# Patient Record
Sex: FEMALE | Race: WHITE | NOT HISPANIC OR LATINO | Employment: OTHER | URBAN - METROPOLITAN AREA
[De-identification: names, ages, dates, MRNs, and addresses within clinical notes are randomized per-mention and may not be internally consistent; named-entity substitution may affect disease eponyms.]

---

## 2020-05-28 ENCOUNTER — HISTORICAL (OUTPATIENT)
Dept: ADMINISTRATIVE | Facility: HOSPITAL | Age: 70
End: 2020-05-28

## 2021-03-18 ENCOUNTER — HISTORICAL (OUTPATIENT)
Dept: ADMINISTRATIVE | Facility: HOSPITAL | Age: 71
End: 2021-03-18

## 2021-09-15 ENCOUNTER — HISTORICAL (OUTPATIENT)
Dept: ADMINISTRATIVE | Facility: HOSPITAL | Age: 71
End: 2021-09-15

## 2021-09-15 LAB
BUN SERPL-MCNC: 24.7 MG/DL (ref 9.8–20.1)
CALCIUM SERPL-MCNC: 10.8 MG/DL (ref 8.4–10.2)
CHLORIDE SERPL-SCNC: 105 MMOL/L (ref 98–107)
CO2 SERPL-SCNC: 29 MMOL/L (ref 23–31)
CREAT SERPL-MCNC: 1.11 MG/DL (ref 0.55–1.02)
CREAT/UREA NIT SERPL: 22
DEPRECATED CALCIDIOL+CALCIFEROL SERPL-MC: 73.7 NG/ML (ref 30–80)
GLUCOSE SERPL-MCNC: 115 MG/DL (ref 82–115)
POTASSIUM SERPL-SCNC: 3.8 MMOL/L (ref 3.5–5.1)
SODIUM SERPL-SCNC: 144 MMOL/L (ref 136–145)
T3FREE SERPL-MCNC: 1.41 PG/ML (ref 1.58–3.91)
T4 FREE SERPL-MCNC: 0.74 NG/DL (ref 0.7–1.48)
TSH SERPL-ACNC: 0.32 UIU/ML (ref 0.35–4.94)

## 2022-03-14 ENCOUNTER — HISTORICAL (OUTPATIENT)
Dept: ADMINISTRATIVE | Facility: HOSPITAL | Age: 72
End: 2022-03-14

## 2022-03-14 LAB
ABS NEUT (OLG): 3.65 (ref 2.1–9.2)
ALBUMIN SERPL-MCNC: 3.7 G/DL (ref 3.4–4.8)
ALBUMIN/GLOB SERPL: 1.1 {RATIO} (ref 1.1–2)
ALP SERPL-CCNC: 59 U/L (ref 40–150)
ALT SERPL-CCNC: 23 U/L (ref 0–55)
APPEARANCE, UA: CLEAR
AST SERPL-CCNC: 20 U/L (ref 5–34)
BACTERIA SPEC CULT: NORMAL
BASOPHILS # BLD AUTO: 0 10*3/UL (ref 0–0.2)
BASOPHILS NFR BLD AUTO: 0 %
BILIRUB SERPL-MCNC: 0.4 MG/DL
BILIRUB UR QL STRIP: NEGATIVE
BILIRUBIN DIRECT+TOT PNL SERPL-MCNC: 0.2 (ref 0–0.5)
BILIRUBIN DIRECT+TOT PNL SERPL-MCNC: 0.2 (ref 0–0.8)
BUDDING YEAST: NORMAL
BUN SERPL-MCNC: 14.9 MG/DL (ref 9.8–20.1)
CALCIUM SERPL-MCNC: 10 MG/DL (ref 8.7–10.5)
CASTS, UA: 9
CHLORIDE SERPL-SCNC: 102 MMOL/L (ref 98–107)
CHOLEST SERPL-MCNC: 183 MG/DL
CHOLEST/HDLC SERPL: 5 {RATIO} (ref 0–5)
CO2 SERPL-SCNC: 30 MMOL/L (ref 23–31)
COLOR UR: NORMAL
CREAT SERPL-MCNC: 0.76 MG/DL (ref 0.55–1.02)
CRYSTALS: NORMAL
EOSINOPHIL # BLD AUTO: 0.1 10*3/UL (ref 0–0.9)
EOSINOPHIL NFR BLD AUTO: 2 %
ERYTHROCYTE [DISTWIDTH] IN BLOOD BY AUTOMATED COUNT: 12.9 % (ref 11.5–17)
EST. AVERAGE GLUCOSE BLD GHB EST-MCNC: 119.8 MG/DL
GLOBULIN SER-MCNC: 3.5 G/DL (ref 2.4–3.5)
GLUCOSE (UA): NEGATIVE
GLUCOSE SERPL-MCNC: 105 MG/DL (ref 82–115)
HBA1C MFR BLD: 5.8 %
HCT VFR BLD AUTO: 40.5 % (ref 37–47)
HDLC SERPL-MCNC: 40 MG/DL (ref 35–60)
HEMOLYSIS INTERF INDEX SERPL-ACNC: 0
HGB BLD-MCNC: 13.2 G/DL (ref 12–16)
HGB UR QL STRIP: NEGATIVE
ICTERIC INTERF INDEX SERPL-ACNC: 0
KETONES UR QL STRIP: NEGATIVE
LDLC SERPL CALC-MCNC: 108 MG/DL (ref 50–140)
LEUKOCYTE ESTERASE UR QL STRIP: NORMAL
LIPEMIC INTERF INDEX SERPL-ACNC: 0
LYMPHOCYTES # BLD AUTO: 2.4 10*3/UL (ref 0.6–4.6)
LYMPHOCYTES NFR BLD AUTO: 36 %
MANUAL DIFF? (OHS): NO
MCH RBC QN AUTO: 29.9 PG (ref 27–31)
MCHC RBC AUTO-ENTMCNC: 32.6 G/DL (ref 33–36)
MCV RBC AUTO: 91.8 FL (ref 80–94)
MONOCYTES # BLD AUTO: 0.6 10*3/UL (ref 0.1–1.3)
MONOCYTES NFR BLD AUTO: 9 %
NEUTROPHILS # BLD AUTO: 3.65 10*3/UL (ref 2.1–9.2)
NEUTROPHILS NFR BLD AUTO: 53 %
NITRITE UR QL STRIP: NEGATIVE
PH UR STRIP: 5.5 [PH] (ref 5–9)
PLATELET # BLD AUTO: 300 10*3/UL (ref 130–400)
PMV BLD AUTO: 10 FL (ref 9.4–12.4)
POTASSIUM SERPL-SCNC: 3.7 MMOL/L (ref 3.5–5.1)
PROT SERPL-MCNC: 7.2 G/DL (ref 5.8–7.6)
PROT UR QL STRIP: NEGATIVE
RBC # BLD AUTO: 4.41 10*6/UL (ref 4.2–5.4)
RBC #/AREA URNS HPF: NORMAL /[HPF] (ref 0–2)
SMALL ROUND CELLS, UA: NORMAL
SODIUM SERPL-SCNC: 142 MMOL/L (ref 136–145)
SP GR UR STRIP: 1.02 (ref 1–1.03)
SPERM URNS QL MICRO: NORMAL
SQUAMOUS EPITHELIAL, UA: 7 (ref 0–4)
T3FREE SERPL-MCNC: 6.85 PG/ML (ref 1.58–3.91)
T4 FREE SERPL-MCNC: 0.77 NG/DL (ref 0.7–1.48)
TRIGL SERPL-MCNC: 176 MG/DL (ref 37–140)
TSH SERPL-ACNC: 0.07 M[IU]/L (ref 0.35–4.94)
UROBILINOGEN UR STRIP-ACNC: 0.2
VLDLC SERPL CALC-MCNC: 35 MG/DL
WBC # SPEC AUTO: 6.8 10*3/UL (ref 4.5–11.5)
WBC #/AREA URNS HPF: NORMAL /[HPF] (ref 0–2)

## 2022-03-15 LAB — DEPRECATED CALCIDIOL+CALCIFEROL SERPL-MC: 75 NG/ML (ref 30–80)

## 2022-04-10 ENCOUNTER — HISTORICAL (OUTPATIENT)
Dept: ADMINISTRATIVE | Facility: HOSPITAL | Age: 72
End: 2022-04-10

## 2022-04-28 DIAGNOSIS — M51.36 DDD (DEGENERATIVE DISC DISEASE), LUMBAR: Primary | ICD-10-CM

## 2022-04-29 VITALS
WEIGHT: 224.56 LBS | SYSTOLIC BLOOD PRESSURE: 138 MMHG | DIASTOLIC BLOOD PRESSURE: 74 MMHG | OXYGEN SATURATION: 95 % | HEIGHT: 64 IN | BODY MASS INDEX: 38.34 KG/M2

## 2022-05-02 ENCOUNTER — TELEPHONE (OUTPATIENT)
Dept: ADMINISTRATIVE | Facility: HOSPITAL | Age: 72
End: 2022-05-02

## 2022-05-02 NOTE — TELEPHONE ENCOUNTER
Type:  Needs Medical Advice    Who Called: pt  Symptoms (please be specific): na   How long has patient had these symptoms:  na  Pharmacy name and phone #:  na  Would the patient rather a call back or a response via [facility]? Call back   Best Call Back Number: [phone]  Additional Information: pt requesting to have information for mri faxed to Dr. Hunt fax number [phone]  Please advise

## 2022-06-02 ENCOUNTER — LAB VISIT (OUTPATIENT)
Dept: LAB | Facility: HOSPITAL | Age: 72
End: 2022-06-02
Attending: FAMILY MEDICINE
Payer: MEDICARE

## 2022-06-02 DIAGNOSIS — R73.02 IGT (IMPAIRED GLUCOSE TOLERANCE): ICD-10-CM

## 2022-06-02 DIAGNOSIS — E78.5 HYPERLIPIDEMIA, UNSPECIFIED HYPERLIPIDEMIA TYPE: ICD-10-CM

## 2022-06-02 DIAGNOSIS — E03.9 HYPOTHYROIDISM, UNSPECIFIED TYPE: Primary | ICD-10-CM

## 2022-06-02 LAB
CHOLEST SERPL-MCNC: 200 MG/DL
CHOLEST/HDLC SERPL: 4 {RATIO} (ref 0–5)
EST. AVERAGE GLUCOSE BLD GHB EST-MCNC: 122.6 MG/DL
HBA1C MFR BLD: 5.9 %
HDLC SERPL-MCNC: 49 MG/DL (ref 35–60)
LDLC SERPL CALC-MCNC: 126 MG/DL (ref 50–140)
T3FREE SERPL-MCNC: 1.69 PG/ML (ref 1.57–3.91)
T4 FREE SERPL-MCNC: 0.62 NG/DL (ref 0.7–1.48)
TRIGL SERPL-MCNC: 127 MG/DL (ref 37–140)
TSH SERPL-ACNC: 11.43 UIU/ML (ref 0.35–4.94)
VLDLC SERPL CALC-MCNC: 25 MG/DL

## 2022-06-02 PROCEDURE — 83036 HEMOGLOBIN GLYCOSYLATED A1C: CPT

## 2022-06-02 PROCEDURE — 84481 FREE ASSAY (FT-3): CPT

## 2022-06-02 PROCEDURE — 80061 LIPID PANEL: CPT

## 2022-06-02 PROCEDURE — 84439 ASSAY OF FREE THYROXINE: CPT

## 2022-06-02 PROCEDURE — 36415 COLL VENOUS BLD VENIPUNCTURE: CPT

## 2022-06-02 PROCEDURE — 84443 ASSAY THYROID STIM HORMONE: CPT

## 2022-06-08 RX ORDER — KETOCONAZOLE 20 MG/G
CREAM TOPICAL
COMMUNITY
Start: 2022-02-24

## 2022-06-08 RX ORDER — TRAZODONE HYDROCHLORIDE 50 MG/1
TABLET ORAL
COMMUNITY
Start: 2022-05-19 | End: 2022-08-15

## 2022-06-08 RX ORDER — ACETAMINOPHEN, DIPHENHYDRAMINE HCL, PHENYLEPHRINE HCL 325; 25; 5 MG/1; MG/1; MG/1
1 TABLET ORAL NIGHTLY
COMMUNITY
End: 2023-05-09

## 2022-06-08 RX ORDER — SERTRALINE HYDROCHLORIDE 25 MG/1
25 TABLET, FILM COATED ORAL
COMMUNITY
Start: 2021-06-22 | End: 2023-11-21

## 2022-06-08 RX ORDER — HYDROCORTISONE 25 MG/G
CREAM TOPICAL
COMMUNITY
Start: 2022-02-24 | End: 2023-11-21

## 2022-06-08 RX ORDER — NORETHINDRONE ACETATE AND ETHINYL ESTRADIOL 1; 5 MG/1; UG/1
TABLET ORAL
COMMUNITY
End: 2023-05-09

## 2022-06-08 RX ORDER — DIPHENHYDRAMINE HCL 25 MG
25 CAPSULE ORAL
COMMUNITY

## 2022-06-08 RX ORDER — VALACYCLOVIR HYDROCHLORIDE 500 MG/1
TABLET, FILM COATED ORAL
COMMUNITY
Start: 2022-05-19 | End: 2023-11-21 | Stop reason: SDUPTHER

## 2022-06-08 RX ORDER — BIOTIN 1 MG
1000 TABLET ORAL
COMMUNITY

## 2022-06-08 RX ORDER — BENAZEPRIL HYDROCHLORIDE 40 MG/1
TABLET ORAL
COMMUNITY
Start: 2021-12-27 | End: 2022-12-27 | Stop reason: SDUPTHER

## 2022-06-08 RX ORDER — DICLOFENAC SODIUM 75 MG/1
75 TABLET, DELAYED RELEASE ORAL 2 TIMES DAILY WITH MEALS
COMMUNITY
Start: 2022-05-10 | End: 2023-05-09

## 2022-06-08 RX ORDER — LORATADINE 10 MG/1
10 TABLET ORAL
COMMUNITY
End: 2023-05-09

## 2022-06-08 RX ORDER — AMOXICILLIN 500 MG
2 CAPSULE ORAL
COMMUNITY
End: 2023-05-09

## 2022-06-08 RX ORDER — THYROID 30 MG/1
TABLET ORAL
COMMUNITY
Start: 2021-09-27 | End: 2022-08-01 | Stop reason: SDUPTHER

## 2022-06-08 RX ORDER — HYDROCHLOROTHIAZIDE 25 MG/1
TABLET ORAL
COMMUNITY
Start: 2021-09-27 | End: 2022-10-06

## 2022-06-08 RX ORDER — SUMATRIPTAN SUCCINATE 100 MG/1
100 TABLET ORAL
COMMUNITY
End: 2023-05-09

## 2022-06-08 RX ORDER — GABAPENTIN 300 MG/1
CAPSULE ORAL
COMMUNITY
Start: 2022-05-10 | End: 2023-05-09

## 2022-06-08 RX ORDER — IBUPROFEN 200 MG
2 CAPSULE ORAL DAILY
COMMUNITY

## 2022-07-11 ENCOUNTER — OFFICE VISIT (OUTPATIENT)
Dept: PRIMARY CARE CLINIC | Facility: CLINIC | Age: 72
End: 2022-07-11
Payer: MEDICARE

## 2022-07-11 VITALS
SYSTOLIC BLOOD PRESSURE: 122 MMHG | DIASTOLIC BLOOD PRESSURE: 70 MMHG | BODY MASS INDEX: 35.45 KG/M2 | OXYGEN SATURATION: 95 % | HEART RATE: 70 BPM | WEIGHT: 205.13 LBS

## 2022-07-11 DIAGNOSIS — I20.9 ANGINA PECTORIS, UNSPECIFIED: ICD-10-CM

## 2022-07-11 DIAGNOSIS — R73.02 IGT (IMPAIRED GLUCOSE TOLERANCE): ICD-10-CM

## 2022-07-11 DIAGNOSIS — E66.01 MORBID (SEVERE) OBESITY DUE TO EXCESS CALORIES: Primary | ICD-10-CM

## 2022-07-11 DIAGNOSIS — E03.9 HYPOTHYROIDISM, UNSPECIFIED TYPE: ICD-10-CM

## 2022-07-11 DIAGNOSIS — I10 HYPERTENSION, UNSPECIFIED TYPE: ICD-10-CM

## 2022-07-11 PROCEDURE — 99214 PR OFFICE/OUTPT VISIT, EST, LEVL IV, 30-39 MIN: ICD-10-PCS | Mod: ,,, | Performed by: FAMILY MEDICINE

## 2022-07-11 PROCEDURE — 99214 OFFICE O/P EST MOD 30 MIN: CPT | Mod: ,,, | Performed by: FAMILY MEDICINE

## 2022-07-11 NOTE — PROGRESS NOTES
Chief Complaint  Chief Complaint   Patient presents with    Follow-up     With labs       History of Present Illness  Patient presents clinic for routine follow-up with lab review.  Lab work was performed approximately 1 month ago with plans follow-up in clinic at that time.  TSH revealed be over 11 after the instillation of armor thyroid from 150 daily down to 135 daily suppressed at less 0.06.  Free T4 at this time noted to be suppressed at 0.62 with free T3 within normal range.  Hemoglobin A1c elevated at 5.9% with a known history of IGT and FLP performed with LDL less than 130.  The patient's blood pressure is within normal range in clinic at this time and she reports that other than generalized fatigue she denies any acute complaints or concerns today.    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 Negative for chills and fever.   HENT: Negative for congestion and sore throat.    Eyes: Negative for redness and visual disturbance.   Respiratory: Negative for cough and shortness of breath.    Cardiovascular: Negative for chest pain and palpitations.   Gastrointestinal: Negative for nausea and vomiting.   Musculoskeletal: Negative for arthralgias and myalgias.   Skin: Negative for pallor and rash.   Neurological: Negative for dizziness and headaches.   Psychiatric/Behavioral: Negative for agitation and dysphoric mood.       Physical Exam  Physical Exam  Constitutional:       Appearance: She is obese.   HENT:      Head: Normocephalic and atraumatic.   Eyes:      General: No scleral icterus.     Conjunctiva/sclera: Conjunctivae  normal.   Cardiovascular:      Rate and Rhythm: Normal rate and regular rhythm.   Pulmonary:      Effort: Pulmonary effort is normal.      Breath sounds: Normal breath sounds.   Skin:     General: Skin is warm and dry.   Neurological:      General: No focal deficit present.      Mental Status: She is alert and oriented to person, place, and time.   Psychiatric:         Mood and Affect: Mood normal.         Behavior: Behavior normal.         Problem List/Past Medical History  Past Medical History:   Diagnosis Date    HTN (hypertension)     Hypothyroidism, unspecified     Insomnia     CURTIS (obstructive sleep apnea)     Vitamin D deficiency        Procedure/Surgical History  Past Surgical History:   Procedure Laterality Date    cataract surgery      DILATION AND CURETTAGE OF UTERUS      TONSILLECTOMY         Medications  Current Outpatient Medications on File Prior to Visit   Medication Sig Dispense Refill    aspirin-acetaminophen-caffeine 250-250-65 mg (EXCEDRIN MIGRAINE) 250-250-65 mg per tablet Take 1 tablet by mouth every 6 (six) hours as needed.      benazepriL (LOTENSIN) 40 MG tablet   See Instructions, TAKE 1 TABLET BY MOUTH DAILY, # 90 tab(s), 3 Refill(s), Pharmacy: [facility] #[id number], 162, cm, Height/Length Dosing, 09/14/21 8:45:00 CDT, 101.85, kg, Weight Dosing, 09/14/21 8:45:00 CDT      biotin 1 mg tablet Take 1,000 mcg by mouth.      calcium citrate (CALCITRATE) 200 mg (950 mg) tablet Take 1 tablet by mouth once daily.      diclofenac (VOLTAREN) 75 MG EC tablet Take 75 mg by mouth 2 (two) times daily with meals.      diphenhydrAMINE (BENADRYL) 25 mg capsule Take 25 mg by mouth.      gabapentin (NEURONTIN) 300 MG capsule TAKE ONE CAPSULE BY MOUTH TWICE DAILY FOR 3 DAYS THEN TAKE 1 CAPSULE THREE TIMES DAILY      hydroCHLOROthiazide (HYDRODIURIL) 25 MG tablet   See Instructions, TAKE 1 TABLET BY MOUTH DAILY, # 90 tab(s), 3 Refill(s), Pharmacy: [facility] #[id number], 162, cm,  Height/Length Dosing, 09/14/21 8:45:00 CDT, 101.85, kg, Weight Dosing, 09/14/21 8:45:00 CDT      hydrocortisone 2.5 % cream APPLY TO RASH TWICE DAILY UNTIL CLEAR      ketoconazole (NIZORAL) 2 % cream APPLY TO FACE TWICE DAILY UNTIL CLEAR      loratadine (CLARITIN) 10 mg tablet Take 10 mg by mouth.      melatonin 10 mg Tab Take 1 tablet by mouth nightly.      norethindrone-ethinyl estradiol (FEMHRT 1/5) 1-5 mg-mcg Tab Take by mouth.      omega-3 fatty acids/fish oil (FISH OIL-OMEGA-3 FATTY ACIDS) 300-1,000 mg capsule Take 2 g by mouth.      sertraline (ZOLOFT) 25 MG tablet Take 25 mg by mouth.      sumatriptan (IMITREX) 100 MG tablet Take 100 mg by mouth every 2 (two) hours as needed.      thyroid, pork, (ARMOUR THYROID) 30 mg Tab   See Instructions, TAKE 1 TABLET BY MOUTH DAILY, # 90 tab(s), 3 Refill(s), Pharmacy: [facility] #[id number], 162, cm, Height/Length Dosing, 09/14/21 8:45:00 CDT, 101.85, kg, Weight Dosing, 09/14/21 8:45:00 CDT      traZODone (DESYREL) 50 MG tablet TAKE 1 TABLET BY MOUTH EVERY DAY AT BEDTIME AS NEEDED FOR SLEEP      valACYclovir (VALTREX) 500 MG tablet        No current facility-administered medications on file prior to visit.       Allegies  Review of patient's allergies indicates:   Allergen Reactions    Adhesive Dermatitis     SKIN PATCHES        Social History  Social History     Socioeconomic History    Marital status:    Tobacco Use    Smoking status: Never Smoker    Smokeless tobacco: Never Used   Substance and Sexual Activity    Alcohol use: Never    Drug use: Never    Sexual activity: Not Currently       Family History  History reviewed. No pertinent family history.      Immunizations    There is no immunization history on file for this patient.    Health Maintenance  Health Maintenance   Topic Date Due    Hepatitis C Screening  Never done    TETANUS VACCINE  Never done    High Dose Statin  Never done    DEXA Scan  04/13/2021    Mammogram   07/01/2022    Lipid Panel  06/02/2027        Assessment / Plan  Problem List Items Addressed This Visit        Cardiac/Vascular    Angina pectoris, unspecified    Hypertension    Current Assessment & Plan     Blood pressure goal <140/90 (<130/80 if otherwise noted), recommend DASH diet, record BP at home daily and bring log to next office visit to assure that home cuff is calibrated at minimum every 12 months, continue current medication regimen.              Endocrine    Morbid (severe) obesity due to excess calories - Primary    Hypothyroidism    Current Assessment & Plan     Switch [place] Thyroid from 130 mg daily to 130 mg every other day with 150 mg every other day and reassess TSH and free T4 and free T3 levels at 2 month interval           IGT (impaired glucose tolerance)    Overview     Continue ADA diet recheck A1c at routine follow-up                 RTC 2 months for wellness    Anthony Lazar

## 2022-07-11 NOTE — ASSESSMENT & PLAN NOTE
Switch [place] Thyroid to 150mg qday with on 120mg on Sunday and reassess TSH and free T4 and free T3 levels at 2 month interval

## 2022-07-29 ENCOUNTER — TELEPHONE (OUTPATIENT)
Dept: ADMINISTRATIVE | Facility: HOSPITAL | Age: 72
End: 2022-07-29
Payer: MEDICARE

## 2022-08-01 DIAGNOSIS — E03.9 HYPOTHYROIDISM, UNSPECIFIED TYPE: Primary | ICD-10-CM

## 2022-08-01 RX ORDER — THYROID 120 MG/1
120 TABLET ORAL
Qty: 90 TABLET | Refills: 3 | Status: SHIPPED | OUTPATIENT
Start: 2022-08-01 | End: 2022-08-01 | Stop reason: SDUPTHER

## 2022-08-01 RX ORDER — THYROID 120 MG/1
120 TABLET ORAL
Qty: 90 TABLET | Refills: 3 | Status: SHIPPED | OUTPATIENT
Start: 2022-08-01 | End: 2024-03-06

## 2022-08-31 DIAGNOSIS — D68.9 COAGULOPATHY: ICD-10-CM

## 2022-08-31 DIAGNOSIS — Z01.818 PREOPERATIVE CLEARANCE: Primary | ICD-10-CM

## 2022-09-07 ENCOUNTER — LAB VISIT (OUTPATIENT)
Dept: LAB | Facility: HOSPITAL | Age: 72
End: 2022-09-07
Attending: FAMILY MEDICINE
Payer: MEDICARE

## 2022-09-07 DIAGNOSIS — D68.9 COAGULOPATHY: ICD-10-CM

## 2022-09-07 DIAGNOSIS — Z01.818 PREOPERATIVE CLEARANCE: ICD-10-CM

## 2022-09-07 LAB
ALBUMIN SERPL-MCNC: 3.8 GM/DL (ref 3.4–4.8)
ANION GAP SERPL CALC-SCNC: 9 MEQ/L
APTT PPP: 34.5 SECONDS (ref 23.2–33.7)
BASOPHILS # BLD AUTO: 0.04 X10(3)/MCL (ref 0–0.2)
BASOPHILS NFR BLD AUTO: 0.6 %
BUN SERPL-MCNC: 15 MG/DL (ref 9.8–20.1)
CALCIUM SERPL-MCNC: 10.1 MG/DL (ref 8.4–10.2)
CHLORIDE SERPL-SCNC: 104 MMOL/L (ref 98–107)
CO2 SERPL-SCNC: 28 MMOL/L (ref 23–31)
CREAT SERPL-MCNC: 0.9 MG/DL (ref 0.55–1.02)
CREAT/UREA NIT SERPL: 17
EOSINOPHIL # BLD AUTO: 0.19 X10(3)/MCL (ref 0–0.9)
EOSINOPHIL NFR BLD AUTO: 2.8 %
ERYTHROCYTE [DISTWIDTH] IN BLOOD BY AUTOMATED COUNT: 13 % (ref 11.5–17)
GFR SERPLBLD CREATININE-BSD FMLA CKD-EPI: >60 MLS/MIN/1.73/M2
GLUCOSE SERPL-MCNC: 106 MG/DL (ref 82–115)
HCT VFR BLD AUTO: 38.1 % (ref 37–47)
HGB BLD-MCNC: 12.4 GM/DL (ref 12–16)
IMM GRANULOCYTES # BLD AUTO: 0.02 X10(3)/MCL (ref 0–0.04)
IMM GRANULOCYTES NFR BLD AUTO: 0.3 %
INR BLD: 1.04 (ref 0–1.3)
LYMPHOCYTES # BLD AUTO: 2.25 X10(3)/MCL (ref 0.6–4.6)
LYMPHOCYTES NFR BLD AUTO: 33.3 %
MCH RBC QN AUTO: 29.8 PG (ref 27–31)
MCHC RBC AUTO-ENTMCNC: 32.5 MG/DL (ref 33–36)
MCV RBC AUTO: 91.6 FL (ref 80–94)
MONOCYTES # BLD AUTO: 0.58 X10(3)/MCL (ref 0.1–1.3)
MONOCYTES NFR BLD AUTO: 8.6 %
MRSA PCR SCRN (OHS): DETECTED
NEUTROPHILS # BLD AUTO: 3.7 X10(3)/MCL (ref 2.1–9.2)
NEUTROPHILS NFR BLD AUTO: 54.4 %
NRBC BLD AUTO-RTO: 0 %
PLATELET # BLD AUTO: 293 X10(3)/MCL (ref 130–400)
PMV BLD AUTO: 9.6 FL (ref 7.4–10.4)
POTASSIUM SERPL-SCNC: 3.8 MMOL/L (ref 3.5–5.1)
PREALB SERPL-MCNC: 19.2 MG/DL (ref 14–37)
PROTHROMBIN TIME: 13.5 SECONDS (ref 12.5–14.5)
RBC # BLD AUTO: 4.16 X10(6)/MCL (ref 4.2–5.4)
SODIUM SERPL-SCNC: 141 MMOL/L (ref 136–145)
WBC # SPEC AUTO: 6.8 X10(3)/MCL (ref 4.5–11.5)

## 2022-09-07 PROCEDURE — 82040 ASSAY OF SERUM ALBUMIN: CPT

## 2022-09-07 PROCEDURE — 85025 COMPLETE CBC W/AUTO DIFF WBC: CPT

## 2022-09-07 PROCEDURE — 84134 ASSAY OF PREALBUMIN: CPT

## 2022-09-07 PROCEDURE — 36415 COLL VENOUS BLD VENIPUNCTURE: CPT

## 2022-09-07 PROCEDURE — 85730 THROMBOPLASTIN TIME PARTIAL: CPT

## 2022-09-07 PROCEDURE — 80048 BASIC METABOLIC PNL TOTAL CA: CPT

## 2022-09-07 PROCEDURE — 85610 PROTHROMBIN TIME: CPT

## 2022-09-08 NOTE — PROGRESS NOTES
Please tell the patient that the nasal swab showed they have MRSA in the nasal cavity and because of this are required for 5 days to using nasal antibiotic ointment which I will send to the pharmacy at this time.  To be used least 5 days prior to surgery.

## 2022-09-13 ENCOUNTER — OFFICE VISIT (OUTPATIENT)
Dept: PRIMARY CARE CLINIC | Facility: CLINIC | Age: 72
End: 2022-09-13
Payer: MEDICARE

## 2022-09-13 VITALS
WEIGHT: 218.19 LBS | HEART RATE: 67 BPM | OXYGEN SATURATION: 97 % | SYSTOLIC BLOOD PRESSURE: 139 MMHG | BODY MASS INDEX: 37.71 KG/M2 | DIASTOLIC BLOOD PRESSURE: 78 MMHG

## 2022-09-13 DIAGNOSIS — I10 HYPERTENSION, UNSPECIFIED TYPE: ICD-10-CM

## 2022-09-13 DIAGNOSIS — Z00.00 WELLNESS EXAMINATION: Primary | ICD-10-CM

## 2022-09-13 DIAGNOSIS — R73.02 IGT (IMPAIRED GLUCOSE TOLERANCE): ICD-10-CM

## 2022-09-13 DIAGNOSIS — E03.9 HYPOTHYROIDISM, UNSPECIFIED TYPE: ICD-10-CM

## 2022-09-13 DIAGNOSIS — Z01.818 PREOPERATIVE CLEARANCE: ICD-10-CM

## 2022-09-13 DIAGNOSIS — G47.00 INSOMNIA, UNSPECIFIED TYPE: ICD-10-CM

## 2022-09-13 DIAGNOSIS — Z22.322 MRSA NASAL COLONIZATION: ICD-10-CM

## 2022-09-13 PROCEDURE — 99499 UNLISTED E&M SERVICE: CPT | Mod: ,,, | Performed by: FAMILY MEDICINE

## 2022-09-13 PROCEDURE — 99499 NO LOS: ICD-10-PCS | Mod: ,,, | Performed by: FAMILY MEDICINE

## 2022-09-13 RX ORDER — AMITRIPTYLINE HYDROCHLORIDE 25 MG/1
25 TABLET, FILM COATED ORAL NIGHTLY PRN
Qty: 30 TABLET | Refills: 3 | Status: SHIPPED | OUTPATIENT
Start: 2022-09-13 | End: 2022-09-26 | Stop reason: SDUPTHER

## 2022-09-13 RX ORDER — THYROID, PORCINE 30 MG/1
15 TABLET ORAL DAILY
COMMUNITY
Start: 2022-08-08 | End: 2023-03-21 | Stop reason: SDUPTHER

## 2022-09-13 NOTE — ASSESSMENT & PLAN NOTE
Attempt elavil 25mg qhs and monitor for side effects and discontinue if that or balance issues are noted.

## 2022-09-13 NOTE — PROGRESS NOTES
Chief Complaint  Chief Complaint   Patient presents with    Pre-op Exam       History of Present Illness  Patient presents to clinic for routine scheduled follow-up wellness visit.  Blood work performed prior to this visit includes CBC with no overly concerning findings noted with PT and INR 10 typical range with a PTT minimally elevated at 34.5 with chemistry panel including BMP with no abnormalities .  The patient had other routine labs performed 3 months ago which showed TSH elevated at 11.4264 with mild decrease of free T4 at 0.62 with free T3 within normal range and hemoglobin A1c at 5.9%.  FLP was performed with LDL of 126.  Following these thyroid abnormalities with her last clinic visit 2 months ago medications were adjusted by switching armor thyroid from 150 daily 150 mg every day except 120 mg on Sunday.  Many of the labs performed prior to this visit were for screening prior to surgery with Left L4-L5 TLIF, TBA scheduled, with MRSA by nasal PCR which was positive and the patient was given a prescription for nasal mupirocin for 5 days which she has complied with. Other than back pain main persistent complaint is insomnia not improved with melatonin and prev rx'd trazodone.     PMH:  Hypothyroidism-I with thyroid 150 mg every other day and 130 mg every other day, previously poor reaction to Synthroid causing depression  Hypertension-benazepril 40 mg daily, hydrochlorothiazide 20 mg daily   History of pericarditis-2010, viral source controlled with time with Advil, seasonal allergies-montelukast   Vitamin-D deficiency-supplementation   CURTIS-CPAP  Depression-unmedicated, previously Zoloft 25 mg daily   Recurrent HSV-famciclovir  IGT-max A1c 5.9%    Specialist:  Cardiology- Dr. Macias  Gyn-Dr. Mattie Prater    Review of Systems  Review of Systems   Constitutional:  Positive for fatigue. Negative for chills and fever.   HENT:  Negative for congestion and sore throat.    Eyes:  Negative for redness and  visual disturbance.   Respiratory:  Negative for cough and shortness of breath.    Cardiovascular:  Negative for chest pain and palpitations.   Gastrointestinal:  Negative for nausea and vomiting.   Musculoskeletal:  Positive for back pain. Negative for arthralgias and myalgias.   Skin:  Negative for pallor and rash.   Neurological:  Negative for dizziness and headaches.   Psychiatric/Behavioral:  Positive for sleep disturbance. Negative for agitation and dysphoric mood.      Physical Exam  Physical Exam  Constitutional:       Appearance: Normal appearance.   HENT:      Head: Normocephalic and atraumatic.   Eyes:      General: No scleral icterus.     Conjunctiva/sclera: Conjunctivae normal.   Cardiovascular:      Rate and Rhythm: Normal rate and regular rhythm.   Pulmonary:      Effort: Pulmonary effort is normal.      Breath sounds: Normal breath sounds.   Skin:     General: Skin is warm and dry.   Neurological:      General: No focal deficit present.      Mental Status: She is alert and oriented to person, place, and time.   Psychiatric:         Mood and Affect: Mood normal.         Behavior: Behavior normal.       Problem List/Past Medical History  Past Medical History:   Diagnosis Date    HTN (hypertension)     Hypothyroidism, unspecified     Insomnia     CURTIS (obstructive sleep apnea)     Vitamin D deficiency        Procedure/Surgical History  Past Surgical History:   Procedure Laterality Date    cataract surgery      DILATION AND CURETTAGE OF UTERUS      TONSILLECTOMY         Medications  Current Outpatient Medications on File Prior to Visit   Medication Sig Dispense Refill    ARMOUR THYROID 120 mg Tab TAKE 1 TABLET BY MOUTH DAILY 90 tablet 3    ARMOUR THYROID 30 mg Tab Take 15 mg by mouth once daily.      benazepriL (LOTENSIN) 40 MG tablet   See Instructions, TAKE 1 TABLET BY MOUTH DAILY, # 90 tab(s), 3 Refill(s), Pharmacy: [facility] #[id number], 162, cm, Height/Length Dosing, 09/14/21 8:45:00 CDT,  101.85, kg, Weight Dosing, 09/14/21 8:45:00 CDT      biotin 1 mg tablet Take 1,000 mcg by mouth.      calcium citrate (CALCITRATE) 200 mg (950 mg) tablet Take 1 tablet by mouth once daily.      diphenhydrAMINE (BENADRYL) 25 mg capsule Take 25 mg by mouth.      hydroCHLOROthiazide (HYDRODIURIL) 25 MG tablet   See Instructions, TAKE 1 TABLET BY MOUTH DAILY, # 90 tab(s), 3 Refill(s), Pharmacy: [facility] #[id number], 162, cm, Height/Length Dosing, 09/14/21 8:45:00 CDT, 101.85, kg, Weight Dosing, 09/14/21 8:45:00 CDT      melatonin 10 mg Tab Take 1 tablet by mouth nightly.      valACYclovir (VALTREX) 500 MG tablet       aspirin-acetaminophen-caffeine 250-250-65 mg (EXCEDRIN MIGRAINE) 250-250-65 mg per tablet Take 1 tablet by mouth every 6 (six) hours as needed.      diclofenac (VOLTAREN) 75 MG EC tablet Take 75 mg by mouth 2 (two) times daily with meals.      gabapentin (NEURONTIN) 300 MG capsule TAKE ONE CAPSULE BY MOUTH TWICE DAILY FOR 3 DAYS THEN TAKE 1 CAPSULE THREE TIMES DAILY      hydrocortisone 2.5 % cream APPLY TO RASH TWICE DAILY UNTIL CLEAR      ketoconazole (NIZORAL) 2 % cream APPLY TO FACE TWICE DAILY UNTIL CLEAR      loratadine (CLARITIN) 10 mg tablet Take 10 mg by mouth.      mupirocin calcium 2% nasl oint (BACTROBAN) 2 % Oint by Nasal route 2 (two) times daily. (Patient not taking: Reported on 9/13/2022) 22 g 0    norethindrone-ethinyl estradiol (FEMHRT 1/5) 1-5 mg-mcg Tab Take by mouth.      omega-3 fatty acids/fish oil (FISH OIL-OMEGA-3 FATTY ACIDS) 300-1,000 mg capsule Take 2 g by mouth.      sertraline (ZOLOFT) 25 MG tablet Take 25 mg by mouth.      sumatriptan (IMITREX) 100 MG tablet Take 100 mg by mouth every 2 (two) hours as needed.      thyroid, pork, (ARMOUR THYROID) 120 mg Tab Take 1 tablet (120 mg total) by mouth before breakfast. (Patient not taking: Reported on 9/13/2022) 90 tablet 3    traZODone (DESYREL) 50 MG tablet TAKE 1 TABLET BY MOUTH EVERY DAY AT BEDTIME AS NEEDED FOR SLEEP  (Patient not taking: Reported on 9/13/2022) 30 tablet 11     No current facility-administered medications on file prior to visit.       Allegies  Review of patient's allergies indicates:   Allergen Reactions    Adhesive Dermatitis     SKIN PATCHES        Social History  Social History     Socioeconomic History    Marital status:    Tobacco Use    Smoking status: Never    Smokeless tobacco: Never   Substance and Sexual Activity    Alcohol use: Never    Drug use: Never    Sexual activity: Not Currently       Family History  History reviewed. No pertinent family history.      Immunizations    There is no immunization history on file for this patient.    Health Maintenance  Health Maintenance   Topic Date Due    Hepatitis C Screening  Never done    TETANUS VACCINE  Never done    High Dose Statin  Never done    DEXA Scan  04/13/2021    Mammogram  07/01/2022    Lipid Panel  06/02/2027        Assessment / Plan  Problem List Items Addressed This Visit          Cardiac/Vascular    Hypertension    Current Assessment & Plan     Blood pressure goal <140/90 (<130/80 if otherwise noted), recommend DASH diet, record BP at home daily and bring log to next office visit to assure that home cuff is calibrated at minimum every 12 months, continue current medication regimen.            ID    MRSA nasal colonization    Overview     Complete rx'd course of nasal mupirocin for 5 days.             Endocrine    Hypothyroidism    Current Assessment & Plan     Continue current medication regimen as noted above with recheck of thyroid function studies at routine interval.         IGT (impaired glucose tolerance)    Overview     Continue ADA diet recheck A1c at routine follow-up            Other    Wellness examination - Primary    Current Assessment & Plan     Discussed routine annual health maintenance and screening in addition to acute issues noted below.         Preoperative clearance    Current Assessment & Plan     Cleared at this  time for upcoming curgical procedure with planned L4-L5 L TLIF anytime over the next 30 days.          Insomnia    Current Assessment & Plan     Attempt elavil 25mg qhs and monitor for side effects and discontinue if that or balance issues are noted.             RTC 2 months    Anthony Lazar

## 2022-09-26 DIAGNOSIS — G47.00 INSOMNIA, UNSPECIFIED TYPE: Primary | ICD-10-CM

## 2022-09-26 RX ORDER — AMITRIPTYLINE HYDROCHLORIDE 25 MG/1
25 TABLET, FILM COATED ORAL NIGHTLY PRN
Qty: 30 TABLET | Refills: 3 | Status: SHIPPED | OUTPATIENT
Start: 2022-09-26 | End: 2022-10-10 | Stop reason: SDUPTHER

## 2022-09-29 ENCOUNTER — HOSPITAL ENCOUNTER (OUTPATIENT)
Dept: RADIOLOGY | Facility: HOSPITAL | Age: 72
Discharge: HOME | End: 2022-09-29
Attending: FAMILY MEDICINE
Payer: MEDICARE

## 2022-09-29 DIAGNOSIS — Z01.818 PREOPERATIVE CLEARANCE: ICD-10-CM

## 2022-09-29 PROCEDURE — 71045 X-RAY EXAM CHEST 1 VIEW: CPT | Mod: TC

## 2022-10-10 ENCOUNTER — TELEPHONE (OUTPATIENT)
Dept: PRIMARY CARE CLINIC | Facility: CLINIC | Age: 72
End: 2022-10-10
Payer: MEDICARE

## 2022-10-10 DIAGNOSIS — G47.00 INSOMNIA, UNSPECIFIED TYPE: ICD-10-CM

## 2022-10-10 RX ORDER — AMITRIPTYLINE HYDROCHLORIDE 25 MG/1
25 TABLET, FILM COATED ORAL 2 TIMES DAILY
Qty: 60 TABLET | Refills: 3 | Status: SHIPPED | OUTPATIENT
Start: 2022-10-10 | End: 2023-11-21 | Stop reason: SDUPTHER

## 2022-10-10 NOTE — TELEPHONE ENCOUNTER
----- Message from Lei Cordova sent at 10/6/2022 12:21 PM CDT -----  Regarding: refill pa  .Type:  RX Refill Request    Who Called: dimitrios  Refill or New Rx:refill  RX Name and Strength:amitriptyline (ELAVIL) 25 MG tablet  How is the patient currently taking it? (ex. 1XDay): 2 x day  Is this a 30 day or 90 day RX:30 day  Preferred Pharmacy with phone number: [facility]  Local or Mail Order:local  Ordering Provider:  Would the patient rather a call back or a response via [facility]?   Best Call Back Number: [phone]  Additional Information: Pt said her pharmacy needs a pa cause it states she is taking 1 tablet but she actually taking 2 tablets a day. She said she only has 3 left in her bottle needs it refilled this week. Can nurse give pt a call back.         Pt informed to take two tylenol before flight.

## 2022-11-01 ENCOUNTER — DOCUMENTATION ONLY (OUTPATIENT)
Dept: FAMILY MEDICINE | Facility: CLINIC | Age: 72
End: 2022-11-01
Payer: MEDICARE

## 2022-11-01 LAB — CRC RECOMMENDATION EXT: NORMAL

## 2022-11-11 ENCOUNTER — LAB VISIT (OUTPATIENT)
Dept: LAB | Facility: HOSPITAL | Age: 72
End: 2022-11-11
Attending: FAMILY MEDICINE
Payer: MEDICARE

## 2022-11-11 DIAGNOSIS — I10 HYPERTENSION, UNSPECIFIED TYPE: ICD-10-CM

## 2022-11-11 DIAGNOSIS — E03.9 HYPOTHYROIDISM, UNSPECIFIED TYPE: ICD-10-CM

## 2022-11-11 DIAGNOSIS — R73.02 IGT (IMPAIRED GLUCOSE TOLERANCE): ICD-10-CM

## 2022-11-11 LAB
ALBUMIN SERPL-MCNC: 3.9 GM/DL (ref 3.4–4.8)
ALBUMIN/GLOB SERPL: 1.2 RATIO (ref 1.1–2)
ALP SERPL-CCNC: 101 UNIT/L (ref 40–150)
ALT SERPL-CCNC: 14 UNIT/L (ref 0–55)
AST SERPL-CCNC: 15 UNIT/L (ref 5–34)
BILIRUBIN DIRECT+TOT PNL SERPL-MCNC: 0.4 MG/DL
BUN SERPL-MCNC: 22.9 MG/DL (ref 9.8–20.1)
CALCIUM SERPL-MCNC: 9.9 MG/DL (ref 8.4–10.2)
CHLORIDE SERPL-SCNC: 105 MMOL/L (ref 98–107)
CO2 SERPL-SCNC: 30 MMOL/L (ref 23–31)
CREAT SERPL-MCNC: 0.98 MG/DL (ref 0.55–1.02)
EST. AVERAGE GLUCOSE BLD GHB EST-MCNC: 128.4 MG/DL
GFR SERPLBLD CREATININE-BSD FMLA CKD-EPI: >60 MLS/MIN/1.73/M2
GLOBULIN SER-MCNC: 3.2 GM/DL (ref 2.4–3.5)
GLUCOSE SERPL-MCNC: 101 MG/DL (ref 82–115)
HBA1C MFR BLD: 6.1 %
POTASSIUM SERPL-SCNC: 4.3 MMOL/L (ref 3.5–5.1)
PROT SERPL-MCNC: 7.1 GM/DL (ref 5.8–7.6)
SODIUM SERPL-SCNC: 141 MMOL/L (ref 136–145)
T4 FREE SERPL-MCNC: 0.83 NG/DL (ref 0.7–1.48)
TSH SERPL-ACNC: 0.33 UIU/ML (ref 0.35–4.94)

## 2022-11-11 PROCEDURE — 83036 HEMOGLOBIN GLYCOSYLATED A1C: CPT

## 2022-11-11 PROCEDURE — 84443 ASSAY THYROID STIM HORMONE: CPT

## 2022-11-11 PROCEDURE — 80053 COMPREHEN METABOLIC PANEL: CPT

## 2022-11-11 PROCEDURE — 84439 ASSAY OF FREE THYROXINE: CPT

## 2022-11-11 PROCEDURE — 84480 ASSAY TRIIODOTHYRONINE (T3): CPT

## 2022-11-11 PROCEDURE — 36415 COLL VENOUS BLD VENIPUNCTURE: CPT

## 2022-11-12 LAB — T3 SERPL IA-MCNC: 214 NG/DL (ref 80–200)

## 2022-11-30 ENCOUNTER — OFFICE VISIT (OUTPATIENT)
Dept: PRIMARY CARE CLINIC | Facility: CLINIC | Age: 72
End: 2022-11-30
Payer: MEDICARE

## 2022-11-30 VITALS
BODY MASS INDEX: 38.11 KG/M2 | DIASTOLIC BLOOD PRESSURE: 82 MMHG | WEIGHT: 220.5 LBS | SYSTOLIC BLOOD PRESSURE: 137 MMHG | OXYGEN SATURATION: 96 % | HEART RATE: 86 BPM

## 2022-11-30 DIAGNOSIS — D64.9 ANEMIA, UNSPECIFIED TYPE: ICD-10-CM

## 2022-11-30 DIAGNOSIS — E03.9 HYPOTHYROIDISM, UNSPECIFIED TYPE: Primary | ICD-10-CM

## 2022-11-30 DIAGNOSIS — I10 HYPERTENSION, UNSPECIFIED TYPE: ICD-10-CM

## 2022-11-30 DIAGNOSIS — R73.02 IGT (IMPAIRED GLUCOSE TOLERANCE): ICD-10-CM

## 2022-11-30 DIAGNOSIS — Z00.00 WELLNESS EXAMINATION: ICD-10-CM

## 2022-11-30 PROCEDURE — 99214 PR OFFICE/OUTPT VISIT, EST, LEVL IV, 30-39 MIN: ICD-10-PCS | Mod: ,,, | Performed by: FAMILY MEDICINE

## 2022-11-30 PROCEDURE — 99214 OFFICE O/P EST MOD 30 MIN: CPT | Mod: ,,, | Performed by: FAMILY MEDICINE

## 2022-11-30 NOTE — PROGRESS NOTES
Chief Complaint  Chief Complaint   Patient presents with    Follow-up     With labs       History of Present Illness  Patient presents to clinic for routine scheduled follow-up with lab review after medication adjustment.  At the last visit medications were adjusted to take 150 mcg of [place] thyroid every day instead of 120 on Sunday currently with mild suppression of TSH at 0.3 with free T4 now within normal range previously suppressed.  Hemoglobin A1c is slightly elevated at 6.1% increase from 5.9% at previous check and T3 is now slightly elevated at 214.  CMP reveals no overly concerning findings.  Blood pressure within normal range.    PMH:  Hypothyroidism-[place] thyroid 150 mg daily, previously poor reaction to Synthroid causing depression  Hypertension-benazepril 40 mg daily, hydrochlorothiazide 20 mg daily   History of pericarditis-2010, viral source controlled with time with Advil, seasonal allergies-montelukast   Vitamin-D deficiency-supplementation   CURTIS-CPAP  Depression-unmedicated, previously Zoloft 25 mg daily   Recurrent HSV-famciclovir  IGT-max A1c 6.1%    Specialist:  Cardiology- Dr. Macias  Gyn-Dr. Mattie Prater    Review of Systems  Review of Systems   Constitutional:  Positive for fatigue. Negative for chills and fever.   HENT:  Negative for congestion and sore throat.    Eyes:  Negative for redness and visual disturbance.   Respiratory:  Negative for cough and shortness of breath.    Cardiovascular:  Negative for chest pain and palpitations.   Gastrointestinal:  Negative for nausea and vomiting.   Musculoskeletal:  Positive for back pain. Negative for arthralgias and myalgias.   Skin:  Negative for pallor and rash.   Neurological:  Negative for dizziness and headaches.   Psychiatric/Behavioral:  Positive for sleep disturbance. Negative for agitation and dysphoric mood.      Physical Exam  Physical Exam  Constitutional:       Appearance: Normal appearance.   HENT:      Head: Normocephalic  and atraumatic.   Eyes:      General: No scleral icterus.     Conjunctiva/sclera: Conjunctivae normal.   Cardiovascular:      Rate and Rhythm: Normal rate and regular rhythm.   Pulmonary:      Effort: Pulmonary effort is normal.      Breath sounds: Normal breath sounds.   Skin:     General: Skin is warm and dry.   Neurological:      General: No focal deficit present.      Mental Status: She is alert and oriented to person, place, and time.   Psychiatric:         Mood and Affect: Mood normal.         Behavior: Behavior normal.       Problem List/Past Medical History  Past Medical History:   Diagnosis Date    HTN (hypertension)     Hypothyroidism, unspecified     Insomnia     CURTIS (obstructive sleep apnea)     Vitamin D deficiency        Procedure/Surgical History  Past Surgical History:   Procedure Laterality Date    cataract surgery      DILATION AND CURETTAGE OF UTERUS      ROBOTIC FUSION,SPINE,LUMBAR,TLIF  10/26/2022    with bone graft    TONSILLECTOMY         Medications  Current Outpatient Medications on File Prior to Visit   Medication Sig Dispense Refill    amitriptyline (ELAVIL) 25 MG tablet Take 1 tablet (25 mg total) by mouth 2 (two) times a day. 60 tablet 3    ARMOUR THYROID 120 mg Tab TAKE 1 TABLET BY MOUTH DAILY 90 tablet 3    ARMOUR THYROID 30 mg Tab Take 15 mg by mouth once daily.      aspirin-acetaminophen-caffeine 250-250-65 mg (EXCEDRIN MIGRAINE) 250-250-65 mg per tablet Take 1 tablet by mouth every 6 (six) hours as needed.      benazepriL (LOTENSIN) 40 MG tablet   See Instructions, TAKE 1 TABLET BY MOUTH DAILY, # 90 tab(s), 3 Refill(s), Pharmacy: [facility] #[id number], 162, cm, Height/Length Dosing, 09/14/21 8:45:00 CDT, 101.85, kg, Weight Dosing, 09/14/21 8:45:00 CDT      biotin 1 mg tablet Take 1,000 mcg by mouth.      calcium citrate (CALCITRATE) 200 mg (950 mg) tablet Take 1 tablet by mouth once daily.      diclofenac (VOLTAREN) 75 MG EC tablet Take 75 mg by mouth 2 (two) times daily with  meals.      diphenhydrAMINE (BENADRYL) 25 mg capsule Take 25 mg by mouth.      gabapentin (NEURONTIN) 300 MG capsule TAKE ONE CAPSULE BY MOUTH TWICE DAILY FOR 3 DAYS THEN TAKE 1 CAPSULE THREE TIMES DAILY      hydroCHLOROthiazide (HYDRODIURIL) 25 MG tablet TAKE 1 TABLET BY MOUTH DAILY 90 tablet 3    hydrocortisone 2.5 % cream APPLY TO RASH TWICE DAILY UNTIL CLEAR      ketoconazole (NIZORAL) 2 % cream APPLY TO FACE TWICE DAILY UNTIL CLEAR      loratadine (CLARITIN) 10 mg tablet Take 10 mg by mouth.      melatonin 10 mg Tab Take 1 tablet by mouth nightly.      mupirocin calcium 2% nasl oint (BACTROBAN) 2 % Oint by Nasal route 2 (two) times daily. 22 g 0    norethindrone-ethinyl estradiol (FEMHRT 1/5) 1-5 mg-mcg Tab Take by mouth.      omega-3 fatty acids/fish oil (FISH OIL-OMEGA-3 FATTY ACIDS) 300-1,000 mg capsule Take 2 g by mouth.      sertraline (ZOLOFT) 25 MG tablet Take 25 mg by mouth.      sumatriptan (IMITREX) 100 MG tablet Take 100 mg by mouth every 2 (two) hours as needed.      thyroid, pork, (ARMOUR THYROID) 120 mg Tab Take 1 tablet (120 mg total) by mouth before breakfast. 90 tablet 3    traZODone (DESYREL) 50 MG tablet TAKE 1 TABLET BY MOUTH EVERY DAY AT BEDTIME AS NEEDED FOR SLEEP 30 tablet 11    valACYclovir (VALTREX) 500 MG tablet        No current facility-administered medications on file prior to visit.       Allegies  Review of patient's allergies indicates:   Allergen Reactions    Adhesive Dermatitis     SKIN PATCHES        Social History  Social History     Socioeconomic History    Marital status:    Tobacco Use    Smoking status: Never    Smokeless tobacco: Never   Substance and Sexual Activity    Alcohol use: Never    Drug use: Never    Sexual activity: Not Currently       Family History  History reviewed. No pertinent family history.      Immunizations    There is no immunization history on file for this patient.    Health Maintenance  Health Maintenance   Topic Date Due    Hepatitis C  Screening  Never done    TETANUS VACCINE  Never done    High Dose Statin  Never done    DEXA Scan  04/13/2021    Mammogram  07/07/2023    Lipid Panel  06/02/2027        Assessment / Plan  Problem List Items Addressed This Visit          Cardiac/Vascular    Hypertension    Overview     Blood pressure goal <140/90 (<130/80 if otherwise noted), recommend DASH diet, record BP at home daily and bring log to next office visit to assure that home cuff is calibrated at minimum every 12 months, continue current medication regimen.            Endocrine    Hypothyroidism - Primary    Overview     Slightly supratherapeutic levels at this time with no concerning signs or symptoms.  We will continue current medication regimen reassess follow-up labs at routine visit         IGT (impaired glucose tolerance)    Overview     Continue ADA diet recheck A1c at routine follow-up          RTC 5 months    Anthony Lazar

## 2022-12-19 PROBLEM — Z00.00 WELLNESS EXAMINATION: Status: RESOLVED | Noted: 2022-09-13 | Resolved: 2022-12-19

## 2022-12-27 DIAGNOSIS — I10 HYPERTENSION, UNSPECIFIED TYPE: Primary | ICD-10-CM

## 2022-12-27 RX ORDER — BENAZEPRIL HYDROCHLORIDE 40 MG/1
TABLET ORAL
Qty: 90 TABLET | Refills: 3 | Status: SHIPPED | OUTPATIENT
Start: 2022-12-27

## 2023-03-21 DIAGNOSIS — E03.9 HYPOTHYROIDISM, UNSPECIFIED TYPE: Primary | ICD-10-CM

## 2023-03-21 RX ORDER — THYROID, PORCINE 30 MG/1
30 TABLET ORAL DAILY
Qty: 90 TABLET | Refills: 3 | Status: SHIPPED | OUTPATIENT
Start: 2023-03-21 | End: 2023-12-04

## 2023-05-04 ENCOUNTER — LAB VISIT (OUTPATIENT)
Dept: LAB | Facility: HOSPITAL | Age: 73
End: 2023-05-04
Attending: FAMILY MEDICINE
Payer: MEDICARE

## 2023-05-04 DIAGNOSIS — E03.9 HYPOTHYROIDISM, UNSPECIFIED TYPE: ICD-10-CM

## 2023-05-04 DIAGNOSIS — Z00.00 WELLNESS EXAMINATION: ICD-10-CM

## 2023-05-04 DIAGNOSIS — D64.9 ANEMIA, UNSPECIFIED TYPE: ICD-10-CM

## 2023-05-04 DIAGNOSIS — R73.02 IGT (IMPAIRED GLUCOSE TOLERANCE): ICD-10-CM

## 2023-05-04 LAB
ALBUMIN SERPL-MCNC: 3.8 G/DL (ref 3.4–4.8)
ALBUMIN/GLOB SERPL: 1.2 RATIO (ref 1.1–2)
ALP SERPL-CCNC: 65 UNIT/L (ref 40–150)
ALT SERPL-CCNC: 24 UNIT/L (ref 0–55)
AST SERPL-CCNC: 23 UNIT/L (ref 5–34)
BASOPHILS # BLD AUTO: 0.03 X10(3)/MCL
BASOPHILS NFR BLD AUTO: 0.5 %
BILIRUBIN DIRECT+TOT PNL SERPL-MCNC: 0.6 MG/DL
BUN SERPL-MCNC: 16.6 MG/DL (ref 9.8–20.1)
CALCIUM SERPL-MCNC: 9.7 MG/DL (ref 8.4–10.2)
CHLORIDE SERPL-SCNC: 103 MMOL/L (ref 98–107)
CHOLEST SERPL-MCNC: 185 MG/DL
CHOLEST/HDLC SERPL: 5 {RATIO} (ref 0–5)
CO2 SERPL-SCNC: 30 MMOL/L (ref 23–31)
CREAT SERPL-MCNC: 0.88 MG/DL (ref 0.55–1.02)
EOSINOPHIL # BLD AUTO: 0.06 X10(3)/MCL (ref 0–0.9)
EOSINOPHIL NFR BLD AUTO: 0.9 %
ERYTHROCYTE [DISTWIDTH] IN BLOOD BY AUTOMATED COUNT: 13.6 % (ref 11.5–17)
EST. AVERAGE GLUCOSE BLD GHB EST-MCNC: 125.5 MG/DL
GFR SERPLBLD CREATININE-BSD FMLA CKD-EPI: >60 MLS/MIN/1.73/M2
GLOBULIN SER-MCNC: 3.1 GM/DL (ref 2.4–3.5)
GLUCOSE SERPL-MCNC: 112 MG/DL (ref 82–115)
HBA1C MFR BLD: 6 %
HCT VFR BLD AUTO: 38.6 % (ref 37–47)
HDLC SERPL-MCNC: 40 MG/DL (ref 35–60)
HGB BLD-MCNC: 13 G/DL (ref 12–16)
IMM GRANULOCYTES # BLD AUTO: 0.02 X10(3)/MCL (ref 0–0.04)
IMM GRANULOCYTES NFR BLD AUTO: 0.3 %
LDLC SERPL CALC-MCNC: 105 MG/DL (ref 50–140)
LYMPHOCYTES # BLD AUTO: 2.59 X10(3)/MCL (ref 0.6–4.6)
LYMPHOCYTES NFR BLD AUTO: 38.9 %
MCH RBC QN AUTO: 30.8 PG (ref 27–31)
MCHC RBC AUTO-ENTMCNC: 33.7 G/DL (ref 33–36)
MCV RBC AUTO: 91.5 FL (ref 80–94)
MONOCYTES # BLD AUTO: 0.63 X10(3)/MCL (ref 0.1–1.3)
MONOCYTES NFR BLD AUTO: 9.5 %
NEUTROPHILS # BLD AUTO: 3.33 X10(3)/MCL (ref 2.1–9.2)
NEUTROPHILS NFR BLD AUTO: 49.9 %
NRBC BLD AUTO-RTO: 0 %
PLATELET # BLD AUTO: 271 X10(3)/MCL (ref 130–400)
PMV BLD AUTO: 9.3 FL (ref 7.4–10.4)
POTASSIUM SERPL-SCNC: 3.8 MMOL/L (ref 3.5–5.1)
PROT SERPL-MCNC: 6.9 GM/DL (ref 5.8–7.6)
RBC # BLD AUTO: 4.22 X10(6)/MCL (ref 4.2–5.4)
SODIUM SERPL-SCNC: 141 MMOL/L (ref 136–145)
T4 FREE SERPL-MCNC: 0.69 NG/DL (ref 0.7–1.48)
TRIGL SERPL-MCNC: 199 MG/DL (ref 37–140)
TSH SERPL-ACNC: 0.35 UIU/ML (ref 0.35–4.94)
VLDLC SERPL CALC-MCNC: 40 MG/DL
WBC # SPEC AUTO: 6.66 X10(3)/MCL (ref 4.5–11.5)

## 2023-05-04 PROCEDURE — 83036 HEMOGLOBIN GLYCOSYLATED A1C: CPT

## 2023-05-04 PROCEDURE — 84443 ASSAY THYROID STIM HORMONE: CPT

## 2023-05-04 PROCEDURE — 80061 LIPID PANEL: CPT

## 2023-05-04 PROCEDURE — 84439 ASSAY OF FREE THYROXINE: CPT

## 2023-05-04 PROCEDURE — 36415 COLL VENOUS BLD VENIPUNCTURE: CPT

## 2023-05-04 PROCEDURE — 85025 COMPLETE CBC W/AUTO DIFF WBC: CPT

## 2023-05-04 PROCEDURE — 80053 COMPREHEN METABOLIC PANEL: CPT

## 2023-05-04 PROCEDURE — 84480 ASSAY TRIIODOTHYRONINE (T3): CPT | Mod: 90

## 2023-05-05 LAB — T3 SERPL IA-MCNC: 131 NG/DL (ref 80–200)

## 2023-05-09 ENCOUNTER — OFFICE VISIT (OUTPATIENT)
Dept: PRIMARY CARE CLINIC | Facility: CLINIC | Age: 73
End: 2023-05-09
Payer: MEDICARE

## 2023-05-09 VITALS
OXYGEN SATURATION: 95 % | HEART RATE: 60 BPM | BODY MASS INDEX: 38.97 KG/M2 | WEIGHT: 225.5 LBS | DIASTOLIC BLOOD PRESSURE: 72 MMHG | SYSTOLIC BLOOD PRESSURE: 137 MMHG

## 2023-05-09 DIAGNOSIS — I10 HYPERTENSION, UNSPECIFIED TYPE: ICD-10-CM

## 2023-05-09 DIAGNOSIS — E66.01 MORBID (SEVERE) OBESITY DUE TO EXCESS CALORIES: ICD-10-CM

## 2023-05-09 DIAGNOSIS — R73.02 IGT (IMPAIRED GLUCOSE TOLERANCE): ICD-10-CM

## 2023-05-09 DIAGNOSIS — E03.9 HYPOTHYROIDISM, UNSPECIFIED TYPE: Primary | ICD-10-CM

## 2023-05-09 PROCEDURE — 99214 PR OFFICE/OUTPT VISIT, EST, LEVL IV, 30-39 MIN: ICD-10-PCS | Mod: ,,, | Performed by: FAMILY MEDICINE

## 2023-05-09 PROCEDURE — 99214 OFFICE O/P EST MOD 30 MIN: CPT | Mod: ,,, | Performed by: FAMILY MEDICINE

## 2023-05-09 NOTE — PROGRESS NOTES
Chief Complaint  Chief Complaint   Patient presents with    Follow-up     With labs       History of Present Illness  Patient presents to clinic for routine follow-up with lab review.  Blood work performed prior to this visit includes CBC with no concerning findings with CMP also with no concerning findings with hemoglobin A1c at 6.0% similar to previous check of 6.1% with FLP performed with LDL at 105 with triglycerides elevated at 199.  Thyroid function studies reveal slightly suppressed TSH at 0.349 with paradoxically decreased free T4 at 0.69 with T3 within normal limits at 131.  Patient reports compliance with thyroid medication as noted below and blood pressure within normal limits in clinic at this time.      PMH:  Hypothyroidism-[place] thyroid 150 mg daily, previously poor reaction to Synthroid causing depression  Hypertension-benazepril 40 mg daily, hydrochlorothiazide 20 mg daily   History of pericarditis-2010, viral source controlled with time with Advil, seasonal allergies-montelukast   Vitamin-D deficiency-supplementation   CURTIS-CPAP  Depression-unmedicated, previously Zoloft 25 mg daily   Recurrent HSV-famciclovir  IGT-max A1c 6.1%    Specialist:  Cardiology- Dr. Macias  Gyn-Dr. Mattie Prater    Review of Systems  Review of Systems   Constitutional:  Positive for fatigue. Negative for chills and fever.   HENT:  Negative for congestion and sore throat.    Eyes:  Negative for redness and visual disturbance.   Respiratory:  Negative for cough and shortness of breath.    Cardiovascular:  Negative for chest pain and palpitations.   Gastrointestinal:  Negative for nausea and vomiting.   Musculoskeletal:  Positive for back pain. Negative for arthralgias and myalgias.   Skin:  Negative for pallor and rash.   Neurological:  Negative for dizziness and headaches.   Psychiatric/Behavioral:  Positive for sleep disturbance. Negative for agitation and dysphoric mood.      Physical Exam  Physical  Exam  Constitutional:       Appearance: Normal appearance.   HENT:      Head: Normocephalic and atraumatic.   Eyes:      General: No scleral icterus.     Conjunctiva/sclera: Conjunctivae normal.   Cardiovascular:      Rate and Rhythm: Normal rate and regular rhythm.   Pulmonary:      Effort: Pulmonary effort is normal.      Breath sounds: Normal breath sounds.   Skin:     General: Skin is warm and dry.   Neurological:      General: No focal deficit present.      Mental Status: She is alert and oriented to person, place, and time.   Psychiatric:         Mood and Affect: Mood normal.         Behavior: Behavior normal.       Problem List/Past Medical History  Past Medical History:   Diagnosis Date    HTN (hypertension)     Hypothyroidism, unspecified     Insomnia     CURTIS (obstructive sleep apnea)     Vitamin D deficiency        Procedure/Surgical History  Past Surgical History:   Procedure Laterality Date    cataract surgery      DILATION AND CURETTAGE OF UTERUS      ROBOTIC FUSION,SPINE,LUMBAR,TLIF  10/26/2022    with bone graft    TONSILLECTOMY         Medications  Current Outpatient Medications on File Prior to Visit   Medication Sig Dispense Refill    amitriptyline (ELAVIL) 25 MG tablet Take 1 tablet (25 mg total) by mouth 2 (two) times a day. 60 tablet 3    ARMOUR THYROID 120 mg Tab TAKE 1 TABLET BY MOUTH DAILY 90 tablet 3    ARMOUR THYROID 30 mg Tab Take 1 tablet (30 mg total) by mouth once daily. 90 tablet 3    aspirin-acetaminophen-caffeine 250-250-65 mg (EXCEDRIN MIGRAINE) 250-250-65 mg per tablet Take 1 tablet by mouth every 6 (six) hours as needed.      benazepriL (LOTENSIN) 40 MG tablet See Instructions, TAKE 1 TABLET BY MOUTH DAILY, # 90 tab(s), 3 Refill(s), Pharmacy: [id number], 162, cm, Height/Length Dosing, 09/14/21 8:45:00 CDT, 101.85, kg, Weight Dosing, 09/14/21 8:45:00 CDT 90 tablet 3    biotin 1 mg tablet Take 1,000 mcg by mouth.      calcium citrate (CALCITRATE) 200 mg (950 mg)  tablet Take 1 tablet by mouth once daily.      diphenhydrAMINE (BENADRYL) 25 mg capsule Take 25 mg by mouth.      hydroCHLOROthiazide (HYDRODIURIL) 25 MG tablet TAKE 1 TABLET BY MOUTH DAILY 90 tablet 3    hydrocortisone 2.5 % cream APPLY TO RASH TWICE DAILY UNTIL CLEAR      ketoconazole (NIZORAL) 2 % cream APPLY TO FACE TWICE DAILY UNTIL CLEAR      montelukast (SINGULAIR) 10 mg tablet TAKE 1 TABLET BY MOUTH DAILY 90 tablet 3    sertraline (ZOLOFT) 25 MG tablet Take 25 mg by mouth.      thyroid, pork, (ARMOUR THYROID) 120 mg Tab Take 1 tablet (120 mg total) by mouth before breakfast. 90 tablet 3    traZODone (DESYREL) 50 MG tablet TAKE 1 TABLET BY MOUTH EVERY DAY AT BEDTIME AS NEEDED FOR SLEEP 30 tablet 11    valACYclovir (VALTREX) 500 MG tablet       diclofenac (VOLTAREN) 75 MG EC tablet Take 75 mg by mouth 2 (two) times daily with meals.      gabapentin (NEURONTIN) 300 MG capsule TAKE ONE CAPSULE BY MOUTH TWICE DAILY FOR 3 DAYS THEN TAKE 1 CAPSULE THREE TIMES DAILY      loratadine (CLARITIN) 10 mg tablet Take 10 mg by mouth.      melatonin 10 mg Tab Take 1 tablet by mouth nightly.      mupirocin calcium 2% nasl oint (BACTROBAN) 2 % Oint by Nasal route 2 (two) times daily. 22 g 0    norethindrone-ethinyl estradiol (FEMHRT 1/5) 1-5 mg-mcg Tab Take by mouth.      omega-3 fatty acids/fish oil (FISH OIL-OMEGA-3 FATTY ACIDS) 300-1,000 mg capsule Take 2 g by mouth.      sumatriptan (IMITREX) 100 MG tablet Take 100 mg by mouth every 2 (two) hours as needed.       No current facility-administered medications on file prior to visit.       Allegies  Review of patient's allergies indicates:   Allergen Reactions    Adhesive Dermatitis     SKIN PATCHES        Social History  Social History     Socioeconomic History    Marital status:    Tobacco Use    Smoking status: Never    Smokeless tobacco: Never   Substance and Sexual Activity    Alcohol use: Never    Drug use: Never    Sexual activity: Not Currently       Family  History  History reviewed. No pertinent family history.      Immunizations    There is no immunization history on file for this patient.    Health Maintenance  Health Maintenance   Topic Date Due    Hepatitis C Screening  Never done    TETANUS VACCINE  Never done    Aspirin/Antiplatelet Therapy  Never done    High Dose Statin  Never done    DEXA Scan  04/13/2021    Mammogram  07/07/2023    Lipid Panel  05/04/2028        Assessment / Plan  Problem List Items Addressed This Visit          Cardiac/Vascular    Hypertension    Overview     Blood pressure goal <140/90 (<130/80 if otherwise noted), recommend DASH diet, record BP at home daily and bring log to next office visit to assure that home cuff is calibrated at minimum every 12 months, continue current medication regimen.              Endocrine    Morbid (severe) obesity due to excess calories    Hypothyroidism - Primary    Overview     Continue current medication regimen as noted above with recheck of thyroid function studies at routine interval.           IGT (impaired glucose tolerance)    Overview     Continue ADA diet recheck A1c at routine follow-up            RTC 6 months    Anthony Lazar

## 2023-10-30 DIAGNOSIS — E03.9 HYPOTHYROIDISM, UNSPECIFIED TYPE: Primary | ICD-10-CM

## 2023-10-30 RX ORDER — THYROID 120 MG/1
120 TABLET ORAL DAILY
Qty: 90 TABLET | Refills: 3 | Status: SHIPPED | OUTPATIENT
Start: 2023-10-30 | End: 2023-11-02 | Stop reason: SDUPTHER

## 2023-11-02 DIAGNOSIS — E03.9 HYPOTHYROIDISM, UNSPECIFIED TYPE: ICD-10-CM

## 2023-11-02 RX ORDER — THYROID 120 MG/1
120 TABLET ORAL DAILY
Qty: 90 TABLET | Refills: 3 | Status: SHIPPED | OUTPATIENT
Start: 2023-11-02 | End: 2024-02-28 | Stop reason: SDUPTHER

## 2023-11-09 NOTE — PROGRESS NOTES
Date: 11/22/2023  Patient ID: [id number]   Chief Complaint: Medicare AWV (Without labs)    HPI:   Celena Velasco is a 73 y.o. female here today for a Medicare Wellness.     Opioid Screening: Patient medication list reviewed, patient is not taking prescription opioids. Patient is not using additional opioids than prescribed. Patient is at low risk of substance abuse based on this opioid use history.     Otherwise, patient reports to be doing well. Lately has been feeling like she is not emptying bladder x 2wks. Denies dysuria, hematuria.    Diet: good, healthy.  Has been trying to lose weight, but snacks a lot during the night  Activity level: goes to [employer] pool  MMG: with OBGYN  Pap: with OBGYN  DEXA: 2018  CRC:  Colonoscopy 2015.  Repeat in 10 years      Patient Active Problem List   Diagnosis    Morbid (severe) obesity due to excess calories    Hypothyroidism    Hypertension    IGT (impaired glucose tolerance)    Wellness examination    Preoperative clearance    Insomnia    MRSA nasal colonization    Exercise-induced asthma    Urinary incontinence, post-void dribbling    HSV (herpes simplex virus) infection     Outpatient Medications Marked as Taking for the 11/21/23 encounter (Office Visit) with Nancy Mclaughlin MD   Medication Sig Dispense Refill    ARMOUR THYROID 30 mg Tab Take 1 tablet (30 mg total) by mouth once daily. 90 tablet 3    benazepriL (LOTENSIN) 40 MG tablet See Instructions, TAKE 1 TABLET BY MOUTH DAILY, # 90 tab(s), 3 Refill(s), Pharmacy: [facility] #[id number], 162, cm, Height/Length Dosing, 09/14/21 8:45:00 CDT, 101.85, kg, Weight Dosing, 09/14/21 8:45:00 CDT 90 tablet 3    biotin 1 mg tablet Take 1,000 mcg by mouth.      calcium citrate (CALCITRATE) 200 mg (950 mg) tablet Take 2 tablets by mouth once daily.      diphenhydrAMINE (BENADRYL) 25 mg capsule Take 25 mg by mouth.      hydroCHLOROthiazide (HYDRODIURIL) 25 MG tablet TAKE 1 TABLET BY MOUTH DAILY 90 tablet 3     ketoconazole (NIZORAL) 2 % cream APPLY TO FACE TWICE DAILY UNTIL CLEAR      montelukast (SINGULAIR) 10 mg tablet TAKE 1 TABLET BY MOUTH DAILY 90 tablet 3    thyroid, pork, (ARMOUR THYROID) 120 mg Tab Take 1 tablet (120 mg total) by mouth once daily. 90 tablet 3    tiZANidine (ZANAFLEX) 4 MG tablet Take 4 mg by mouth every 8 (eight) hours as needed.      traZODone (DESYREL) 50 MG tablet TAKE 1 TABLET BY MOUTH EVERY DAY AT BEDTIME AS NEEDED FOR SLEEP 30 tablet 11    [DISCONTINUED] amitriptyline (ELAVIL) 25 MG tablet Take 1 tablet (25 mg total) by mouth 2 (two) times a day. 60 tablet 3    [DISCONTINUED] sertraline (ZOLOFT) 25 MG tablet Take 25 mg by mouth.      [DISCONTINUED] valACYclovir (VALTREX) 500 MG tablet        Past Medical History:   Diagnosis Date    Angina pectoris, unspecified 07/11/2022    HTN (hypertension)     Hypothyroidism, unspecified     Insomnia     CURTIS (obstructive sleep apnea)     Vitamin D deficiency      Past Surgical History:   Procedure Laterality Date    cataract surgery  2021    [place]    DILATION AND CURETTAGE OF UTERUS      ROBOTIC FUSION,SPINE,LUMBAR,TLIF  10/26/2022    with bone graft    TONSILLECTOMY       Review of patient's allergies indicates:   Allergen Reactions    Adhesive Dermatitis     SKIN PATCHES     History reviewed. No pertinent family history.   Social History     Socioeconomic History    Marital status:    Tobacco Use    Smoking status: Never    Smokeless tobacco: Never   Substance and Sexual Activity    Alcohol use: Never    Drug use: Never    Sexual activity: Not Currently     Social Determinants of Health     Financial Resource Strain: Low Risk  (11/21/2023)    Overall Financial Resource Strain (CARDIA)     Difficulty of Paying Living Expenses: Not hard at all   Food Insecurity: No Food Insecurity (11/21/2023)    Hunger Vital Sign     Worried About Running Out of Food in the Last Year: Never true     Ran Out of Food in the Last Year: Never true   Transportation  Needs: No Transportation Needs (11/21/2023)    PRAPARE - Transportation     Lack of Transportation (Medical): No     Lack of Transportation (Non-Medical): No   Physical Activity: Inactive (11/21/2023)    Exercise Vital Sign     Days of Exercise per Week: 0 days     Minutes of Exercise per Session: 0 min   Stress: No Stress Concern Present (11/21/2023)    [employer] [place] [employer] - Occupational Stress Questionnaire     Feeling of Stress : Not at all   Social Connections: Moderately Isolated (11/21/2023)    Social Connection and Isolation Panel [NHANES]     Frequency of Communication with Friends and Family: More than three times a week     Frequency of Social Gatherings with Friends and Family: Once a week     Attends Alevism Services: Never     Active Member of Clubs or Organizations: Yes     Attends Club or Organization Meetings: More than 4 times per year     Marital Status:    Housing Stability: Unknown (11/21/2023)    Housing Stability Vital Sign     Unable to Pay for Housing in the Last Year: No     Unstable Housing in the Last Year: No     Patient Care Team:  Nancy Mclaughlin MD as PCP - General (Family Medicine)  Lily Prater MD (Obstetrics and Gynecology)   Subjective:   Review of Systems   Constitutional:  Negative for fever and weight loss.   HENT:  Negative for congestion, hearing loss and sore throat.    Eyes:  Negative for blurred vision and double vision.   Respiratory:  Positive for shortness of breath (intermittent with activity and with wheezing). Negative for cough.    Cardiovascular:  Negative for chest pain and palpitations.   Gastrointestinal:  Negative for abdominal pain and diarrhea.   Genitourinary:  Positive for frequency. Negative for dysuria, hematuria and urgency.   Musculoskeletal:  Negative for falls.   Skin:  Negative for rash.   Psychiatric/Behavioral:  Negative for depression and memory loss. The patient is not nervous/anxious and does not have  insomnia.      See HPI for details  All Other ROS: Negative except as stated in HPI  Patient Reported Health Risk Assessment  What [age] 70-79  Are you male or female?: Female  During the past four weeks, how much have you been bothered by emotional problems such as feeling anxious, depressed, irritable, sad, or downhearted and blue?: Not at all  During the past five weeks, has your physical and/or emotional health limited your social activities with family, friends, neighbors, or groups?: Not at all  During the past four weeks, how much bodily pain have you generally had?: Mild pain  During the past four weeks, was someone available to help if you needed and wanted help?: Yes, as much as I wanted  During the past four weeks, what was the hardest physical activity you could do for at least two minutes?: Moderate  Can you get to places out of walking distance without help?  (For example, can you travel alone on buses or taxis, or drive your own car?): Yes  Can you go shopping for groceries or clothes without someone's help?: Yes  Can you prepare your own meals?: Yes  Can you do your own housework without help?: Yes  Because of any health problems, do you need the help of another person with your personal care needs such as eating, bathing, dressing, or getting around the house?: No  Can you handle your own money without help?: Yes  During the past four weeks, how would you rate your health in general?: Fair  How have things been going for you during the past four weeks?: Pretty well  Are you having difficulties driving your car?: No  Do you always fasten your seat belt when you are in a car?: Yes, usually  How often in the past four weeks have you been bothered by falling or dizzy when standing up?: Never  How often in the past four weeks have you been bothered by sexual problems?: Never  How often in the past four weeks have you been bothered by trouble eating well?: Never  How often in the past four weeks  "have you been bothered by teeth or denture problems?: Never  How often in the past four weeks have you been bothered with problems using the telephone?: Never  How often in the past four weeks have you been bothered by tiredness or fatigue?: Never  Have you fallen two or more times in the past year?: No  Are you afraid of falling?: No  Are you a smoker?: No  During the past four weeks, how many drinks of wine, beer, or other alcoholic beverages did you have?: No alcohol at all  Do you exercise for about 20 minutes three or more days a week?: No, I usually do not exercise this much  Have you been given any information to help you with hazards in your house that might hurt you?: Yes  Have you been given any information to help you with keeping track of your medications?: Yes  How often do you have trouble taking medicines the way you've been told to take them?: I always take them as prescribed  How confident are you that you can control and manage most of your health problems?: Very confident  What is your race? (Check all that apply.):   Objective:   BP (!) 143/75   Pulse 71   Temp 98.6 °F (37 °C)   Ht 5' 3" (1.6 m)   Wt 102.5 kg (225 lb 14.4 oz)   SpO2 (!) 94%   BMI 40.02 kg/m²   Physical Exam  Vitals reviewed.   Constitutional:       Appearance: Normal appearance.   HENT:      Head: Normocephalic and atraumatic.      Right Ear: Tympanic membrane, ear canal and external ear normal.      Left Ear: Tympanic membrane, ear canal and external ear normal.      Nose: Nose normal. No congestion or rhinorrhea.      Mouth/Throat:      Mouth: Mucous membranes are moist.      Pharynx: Oropharynx is clear.   Eyes:      General: No scleral icterus.     Extraocular Movements: Extraocular movements intact.      Conjunctiva/sclera: Conjunctivae normal.   Cardiovascular:      Rate and Rhythm: Normal rate and regular rhythm.      Pulses: Normal pulses.      Heart sounds: Normal heart sounds.   Pulmonary:      Effort: " Pulmonary effort is normal.      Breath sounds: Normal breath sounds.   Abdominal:      General: Abdomen is flat. Bowel sounds are normal.      Palpations: Abdomen is soft.      Tenderness: There is no abdominal tenderness. There is no guarding or rebound.   Musculoskeletal:         General: No swelling or deformity. Normal range of motion.      Cervical back: Normal range of motion and neck supple.   Skin:     General: Skin is warm and dry.   Neurological:      Mental Status: She is alert and oriented to person, place, and time.   Psychiatric:         Mood and Affect: Mood normal.         Behavior: Behavior normal.         Thought Content: Thought content normal.         Judgment: Judgment normal.            No data to display                  11/21/2023     3:15 PM 5/9/2023     3:15 PM 11/30/2022     1:00 PM 9/13/2022     1:30 PM 7/11/2022     9:00 AM   Fall Risk Assessment - Outpatient   Mobility Status Ambulatory Ambulatory Ambulatory Ambulatory Ambulatory   Number of falls 0 0 0 0 0   Identified as fall risk False False False False False           Depression Screening  Over the past two weeks, has the patient felt down, depressed, or hopeless?: No  Over the past two weeks, has the patient felt little interest or pleasure in doing things?: No  Functional Ability/Safety Screening  Was the patient's timed Up & Go test unsteady or longer than 30 seconds?: No  Does the patient need help with phone, transportation, shopping, preparing meals, housework, laundry, meds, or managing money?: No  Does the patient's home have rugs in the hallway, lack grab bars in the bathroom, lack handrails on the stairs or have poor lighting?: No  Have you noticed any hearing difficulties?: No  Cognitive Function (Assessed through direct observation with due consideration of information obtained by way of patient reports and/or concerns raised by family, friends, caretakers, or others)    Does the patient repeat questions/statements in  the same day?: No  Does the patient have trouble remembering the date, year, and time?: No  Does the patient have difficulty managing finances?: No  Does the patient have a decreased sense of direction?: No  Assessment:       ICD-10-CM ICD-9-CM   1. Wellness examination  Z00.00 V70.0   2. Hypothyroidism, unspecified type  E03.9 244.9   3. Urinary incontinence, post-void dribbling  N39.43 788.35   4. Exercise-induced asthma  J45.990 493.81   5. Insomnia, unspecified type  G47.00 780.52   6. HSV (herpes simplex virus) infection  B00.9 054.9   7. Morbid (severe) obesity due to excess calories  E66.01 278.01   8. IGT (impaired glucose tolerance)  R73.02 790.22   9. Primary hypertension  I10 401.9      Plan:   1. Wellness examination  Overview:  Routine labs and preventative care discussed.  Continue healthy lifestyle modifications        2. Hypothyroidism, unspecified type  Assessment & Plan:  Obtain TFTs and adjust medication as necessary    Orders:  -     T4, Free; Future; Expected date: 11/21/2023  -     TSH; Future; Expected date: 11/21/2023    3. Urinary incontinence, post-void dribbling  Assessment & Plan:  Obtain UA and Ucx and send abx as indicated  Consider Flomax if above is negative    Orders:  -     Urine culture; Future; Expected date: 11/21/2023  -     Urinalysis; Future; Expected date: 11/21/2023    4. Exercise-induced asthma  Assessment & Plan:  Stable   Continue montelukast.  Albuterol sent to pharmacy for emergencies    Orders:  -     albuterol (PROAIR HFA) 90 mcg/actuation inhaler; Inhale 2 puffs into the lungs every 6 (six) hours as needed for Wheezing or Shortness of Breath. Rescue  Dispense: 18 g; Refill: 0    5. Insomnia, unspecified type  Assessment & Plan:  Stable  Continue Elavil prn    Orders:  -     amitriptyline (ELAVIL) 25 MG tablet; Take 1 tablet (25 mg total) by mouth 2 (two) times a day.  Dispense: 60 tablet; Refill: 3    6. HSV (herpes simplex virus) infection  Assessment &  Plan:  Stable  Continue Valtrex prn    Orders:  -     valACYclovir (VALTREX) 500 MG tablet; Take 1 tablet (500 mg total) by mouth 2 (two) times daily.  Dispense: 60 tablet; Refill: 11    7. Morbid (severe) obesity due to excess calories  Assessment & Plan:  Lifestyle modifications   Start semaglutide 0.25 mg Q weekly.  AE discussed    Orders:  -     semaglutide, weight loss, 0.25 mg/0.5 mL PnIj; Inject 0.25 mg into the skin every 7 days.  Dispense: 0.5 mL; Refill: 3    8. IGT (impaired glucose tolerance)  Assessment & Plan:  ADA diet  Start semaglutide as above    Orders:  -     semaglutide, weight loss, 0.25 mg/0.5 mL PnIj; Inject 0.25 mg into the skin every 7 days.  Dispense: 0.5 mL; Refill: 3  -     Hemoglobin A1C; Future; Expected date: 11/21/2023    9. Primary hypertension  Overview:  Blood pressure not at goal in office but at goal at home, <140/90 (<130/80 if otherwise noted)  Record BP at home and call if still greater than 140/90. Consider increasing HCTZ if elevated  Recommend DASH diet  Avoid tobacco use  Record BP at home daily and bring log to next office visit, assure that home cuff is calibrated at minimum every 12 months             Medicare Annual Wellness and Personalized Prevention Plan:   Fall Risk + Home Safety + Hearing Impairment + Depression Screen + Opioid and Substance Abuse Screening + Cognitive Impairment Screen + Health Risk Assessment all reviewed.     Health Maintenance Topics with due status: Not Due       Topic Last Completion Date    Colorectal Cancer Screening 11/03/2015    Hemoglobin A1c (Prediabetes) 05/04/2023    Lipid Panel 05/04/2023      The patient's Health Maintenance was reviewed and the following appears to be due at this time:   Health Maintenance Due   Topic Date Due    Hepatitis C Screening  Never done    COVID-19 Vaccine (1) Never done    TETANUS VACCINE  Never done    Shingles Vaccine (1 of 2) Never done    RSV Vaccine (Age 60+ and Pregnant patients) (1 - 1-dose 60+  series) Never done    Pneumococcal Vaccines (Age 65+) (2 - PPSV23 or PCV20) 09/14/2016    DEXA Scan  04/13/2021    Influenza Vaccine (1) 09/01/2023       Advance Care Planning     Date: 11/09/2023    Power of   I initiated the process of voluntary advance care planning today and explained the importance of this process to the patient.  I introduced the concept of advance directives to the patient, as well. Then the patient received detailed information about the importance of designating a Health Care Power of  (HCPOA). She was also instructed to communicate with this person about their wishes for future healthcare, should she become sick and lose decision-making capacity. Has children with plan        A separate E/M code has been provided to evaluate additional complaints that the patient would like addressed during the dedicated Wellness Exam.    Follow up in about 6 weeks (around 1/2/2024) for Chronic Conditions; obtain records from [facility]LEST[facility] Prater. In addition to their scheduled follow up, the patient has also been instructed to follow up on as needed basis.

## 2023-11-21 ENCOUNTER — OFFICE VISIT (OUTPATIENT)
Dept: PRIMARY CARE CLINIC | Facility: CLINIC | Age: 73
End: 2023-11-21
Payer: MEDICARE

## 2023-11-21 VITALS
HEIGHT: 63 IN | SYSTOLIC BLOOD PRESSURE: 143 MMHG | OXYGEN SATURATION: 94 % | BODY MASS INDEX: 40.02 KG/M2 | HEART RATE: 71 BPM | WEIGHT: 225.88 LBS | TEMPERATURE: 99 F | DIASTOLIC BLOOD PRESSURE: 75 MMHG

## 2023-11-21 DIAGNOSIS — E66.01 MORBID (SEVERE) OBESITY DUE TO EXCESS CALORIES: ICD-10-CM

## 2023-11-21 DIAGNOSIS — B00.9 HSV (HERPES SIMPLEX VIRUS) INFECTION: ICD-10-CM

## 2023-11-21 DIAGNOSIS — E03.9 HYPOTHYROIDISM, UNSPECIFIED TYPE: ICD-10-CM

## 2023-11-21 DIAGNOSIS — I10 PRIMARY HYPERTENSION: ICD-10-CM

## 2023-11-21 DIAGNOSIS — Z00.00 WELLNESS EXAMINATION: Primary | ICD-10-CM

## 2023-11-21 DIAGNOSIS — N39.43 URINARY INCONTINENCE, POST-VOID DRIBBLING: ICD-10-CM

## 2023-11-21 DIAGNOSIS — G47.00 INSOMNIA, UNSPECIFIED TYPE: ICD-10-CM

## 2023-11-21 DIAGNOSIS — J45.990 EXERCISE-INDUCED ASTHMA: ICD-10-CM

## 2023-11-21 DIAGNOSIS — R73.02 IGT (IMPAIRED GLUCOSE TOLERANCE): ICD-10-CM

## 2023-11-21 PROBLEM — I20.9 ANGINA PECTORIS, UNSPECIFIED: Status: RESOLVED | Noted: 2022-07-11 | Resolved: 2023-11-21

## 2023-11-21 PROCEDURE — 99213 OFFICE O/P EST LOW 20 MIN: CPT | Mod: 25,,, | Performed by: STUDENT IN AN ORGANIZED HEALTH CARE EDUCATION/TRAINING PROGRAM

## 2023-11-21 PROCEDURE — G0439 PPPS, SUBSEQ VISIT: HCPCS | Mod: ,,, | Performed by: STUDENT IN AN ORGANIZED HEALTH CARE EDUCATION/TRAINING PROGRAM

## 2023-11-21 PROCEDURE — G0439 PR MEDICARE ANNUAL WELLNESS SUBSEQUENT VISIT: ICD-10-PCS | Mod: ,,, | Performed by: STUDENT IN AN ORGANIZED HEALTH CARE EDUCATION/TRAINING PROGRAM

## 2023-11-21 PROCEDURE — 99213 PR OFFICE/OUTPT VISIT, EST, LEVL III, 20-29 MIN: ICD-10-PCS | Mod: 25,,, | Performed by: STUDENT IN AN ORGANIZED HEALTH CARE EDUCATION/TRAINING PROGRAM

## 2023-11-21 RX ORDER — TIZANIDINE 4 MG/1
4 TABLET ORAL EVERY 8 HOURS PRN
COMMUNITY
Start: 2023-08-22

## 2023-11-21 RX ORDER — AMITRIPTYLINE HYDROCHLORIDE 25 MG/1
25 TABLET, FILM COATED ORAL 2 TIMES DAILY
Qty: 60 TABLET | Refills: 3 | Status: SHIPPED | OUTPATIENT
Start: 2023-11-21 | End: 2024-11-20

## 2023-11-21 RX ORDER — ALBUTEROL SULFATE 90 UG/1
2 AEROSOL, METERED RESPIRATORY (INHALATION) EVERY 6 HOURS PRN
Qty: 18 G | Refills: 0 | Status: SHIPPED | OUTPATIENT
Start: 2023-11-21

## 2023-11-21 RX ORDER — VALACYCLOVIR HYDROCHLORIDE 500 MG/1
500 TABLET, FILM COATED ORAL 2 TIMES DAILY
Qty: 60 TABLET | Refills: 11 | Status: SHIPPED | OUTPATIENT
Start: 2023-11-21

## 2023-11-22 DIAGNOSIS — E66.01 MORBID (SEVERE) OBESITY DUE TO EXCESS CALORIES: ICD-10-CM

## 2023-11-22 DIAGNOSIS — R73.02 IGT (IMPAIRED GLUCOSE TOLERANCE): ICD-10-CM

## 2023-11-27 ENCOUNTER — DOCUMENTATION ONLY (OUTPATIENT)
Dept: PRIMARY CARE CLINIC | Facility: CLINIC | Age: 73
End: 2023-11-27
Payer: MEDICARE

## 2023-11-27 DIAGNOSIS — I10 HYPERTENSION, UNSPECIFIED TYPE: ICD-10-CM

## 2023-11-27 LAB — PAP RECOMMENDATION EXT: NORMAL

## 2023-11-27 RX ORDER — HYDROCHLOROTHIAZIDE 25 MG/1
25 TABLET ORAL DAILY
Qty: 90 TABLET | Refills: 3 | Status: SHIPPED | OUTPATIENT
Start: 2023-11-27 | End: 2024-01-04

## 2023-12-01 ENCOUNTER — LAB VISIT (OUTPATIENT)
Dept: LAB | Facility: HOSPITAL | Age: 73
End: 2023-12-01
Attending: STUDENT IN AN ORGANIZED HEALTH CARE EDUCATION/TRAINING PROGRAM
Payer: MEDICARE

## 2023-12-01 DIAGNOSIS — R73.02 IGT (IMPAIRED GLUCOSE TOLERANCE): ICD-10-CM

## 2023-12-01 DIAGNOSIS — E03.9 HYPOTHYROIDISM, UNSPECIFIED TYPE: ICD-10-CM

## 2023-12-01 DIAGNOSIS — N39.43 URINARY INCONTINENCE, POST-VOID DRIBBLING: ICD-10-CM

## 2023-12-01 LAB
APPEARANCE UR: ABNORMAL
BACTERIA #/AREA URNS AUTO: ABNORMAL /HPF
BILIRUB UR QL STRIP.AUTO: NEGATIVE
COLOR UR AUTO: YELLOW
EST. AVERAGE GLUCOSE BLD GHB EST-MCNC: 131.2 MG/DL
GLUCOSE UR QL STRIP.AUTO: NORMAL
HBA1C MFR BLD: 6.2 %
HYALINE CASTS #/AREA URNS LPF: ABNORMAL /LPF
KETONES UR QL STRIP.AUTO: NEGATIVE
LEUKOCYTE ESTERASE UR QL STRIP.AUTO: 500
MUCOUS THREADS URNS QL MICRO: ABNORMAL /LPF
NITRITE UR QL STRIP.AUTO: NEGATIVE
PH UR STRIP.AUTO: 5.5 [PH]
PROT UR QL STRIP.AUTO: ABNORMAL
RBC #/AREA URNS AUTO: ABNORMAL /HPF
RBC UR QL AUTO: ABNORMAL
SP GR UR STRIP.AUTO: 1.02 (ref 1–1.03)
SQUAMOUS #/AREA URNS LPF: ABNORMAL /HPF
T4 FREE SERPL-MCNC: 0.9 NG/DL (ref 0.7–1.48)
TSH SERPL-ACNC: <0.008 UIU/ML (ref 0.35–4.94)
UROBILINOGEN UR STRIP-ACNC: NORMAL
WBC #/AREA URNS AUTO: >100 /HPF

## 2023-12-01 PROCEDURE — 87086 URINE CULTURE/COLONY COUNT: CPT

## 2023-12-01 PROCEDURE — 84443 ASSAY THYROID STIM HORMONE: CPT

## 2023-12-01 PROCEDURE — 84439 ASSAY OF FREE THYROXINE: CPT

## 2023-12-01 PROCEDURE — 87077 CULTURE AEROBIC IDENTIFY: CPT

## 2023-12-01 PROCEDURE — 36415 COLL VENOUS BLD VENIPUNCTURE: CPT

## 2023-12-01 PROCEDURE — 83036 HEMOGLOBIN GLYCOSYLATED A1C: CPT

## 2023-12-01 PROCEDURE — 81001 URINALYSIS AUTO W/SCOPE: CPT

## 2023-12-04 ENCOUNTER — TELEPHONE (OUTPATIENT)
Dept: PRIMARY CARE CLINIC | Facility: CLINIC | Age: 73
End: 2023-12-04
Payer: MEDICARE

## 2023-12-04 DIAGNOSIS — E03.9 HYPOTHYROIDISM, UNSPECIFIED TYPE: ICD-10-CM

## 2023-12-04 DIAGNOSIS — N30.01 ACUTE CYSTITIS WITH HEMATURIA: Primary | ICD-10-CM

## 2023-12-04 LAB — BACTERIA UR CULT: ABNORMAL

## 2023-12-04 RX ORDER — CIPROFLOXACIN 500 MG/1
500 TABLET ORAL EVERY 12 HOURS
Qty: 14 TABLET | Refills: 0 | Status: SHIPPED | OUTPATIENT
Start: 2023-12-04 | End: 2023-12-11

## 2023-12-04 RX ORDER — THYROID 15 MG/1
15 TABLET ORAL
Qty: 30 TABLET | Refills: 2 | Status: SHIPPED | OUTPATIENT
Start: 2023-12-04 | End: 2024-03-11 | Stop reason: SDUPTHER

## 2023-12-29 NOTE — PROGRESS NOTES
Date: 01/04/2024  Patient ID: [id number]   Chief Complaint: Follow-up    HPI:   Celena Velasco is a 73 y.o. female here today for Follow-up  Most recent labs showed low TSH. Recently decreased Thyroid armour to 135mg qd 1 mos ago.   Last visit Semaglutide was started for obesity, but pt unable to fill 2/2 cost. Patient did lose 2lbs on her own.   Does not check BP at home regularly, but she is compliant with benazepril.  Patient also has concerns for cherry angiomas that developed throughout face and chest.   Patient Active Problem List   Diagnosis    Morbid (severe) obesity due to excess calories    Hypothyroidism    Hypertension    IGT (impaired glucose tolerance)    Wellness examination    Preoperative clearance    Insomnia    MRSA nasal colonization    Exercise-induced asthma    Urinary incontinence, post-void dribbling    HSV (herpes simplex virus) infection    Cherry angioma     Outpatient Medications Marked as Taking for the 1/4/24 encounter (Office Visit) with Nancy Mclaughlin MD   Medication Sig Dispense Refill    albuterol (PROAIR HFA) 90 mcg/actuation inhaler Inhale 2 puffs into the lungs every 6 (six) hours as needed for Wheezing or Shortness of Breath. Rescue 18 g 0    benazepriL (LOTENSIN) 40 MG tablet See Instructions, TAKE 1 TABLET BY MOUTH DAILY, # 90 tab(s), 3 Refill(s), Pharmacy: [id number], 162, cm, Height/Length Dosing, 09/14/21 8:45:00 CDT, 101.85, kg, Weight Dosing, 09/14/21 8:45:00 CDT 90 tablet 3    biotin 1 mg tablet Take 1,000 mcg by mouth.      calcium citrate (CALCITRATE) 200 mg (950 mg) tablet Take 2 tablets by mouth once daily.      diphenhydrAMINE (BENADRYL) 25 mg capsule Take 25 mg by mouth.      doxycycline (PERIOSTAT) 20 MG tablet       montelukast (SINGULAIR) 10 mg tablet TAKE 1 TABLET BY MOUTH DAILY 90 tablet 3    pimecrolimus (ELIDEL) 1 % cream       thyroid, pork, (ARMOUR THYROID) 120 mg Tab Take 1 tablet (120 mg total) by mouth before breakfast. 90 tablet  3    thyroid, pork, (ARMOUR THYROID) 15 mg Tab Take 1 tablet (15 mg total) by mouth before breakfast. 30 tablet 2    tiZANidine (ZANAFLEX) 4 MG tablet Take 4 mg by mouth every 8 (eight) hours as needed.      valACYclovir (VALTREX) 500 MG tablet Take 1 tablet (500 mg total) by mouth 2 (two) times daily. 60 tablet 11    [DISCONTINUED] hydroCHLOROthiazide (HYDRODIURIL) 25 MG tablet Take 1 tablet (25 mg total) by mouth once daily. 90 tablet 3     Past Medical History:   Diagnosis Date    Angina pectoris, unspecified 07/11/2022    HTN (hypertension)     Hypothyroidism, unspecified     Insomnia     CURTIS (obstructive sleep apnea)     Vitamin D deficiency      Past Surgical History:   Procedure Laterality Date    cataract surgery  2021    [place]    DILATION AND CURETTAGE OF UTERUS      ROBOTIC FUSION,SPINE,LUMBAR,TLIF  10/26/2022    with bone graft    TONSILLECTOMY       Review of patient's allergies indicates:   Allergen Reactions    Adhesive Dermatitis     SKIN PATCHES     History reviewed. No pertinent family history.   Social History     Socioeconomic History    Marital status:    Tobacco Use    Smoking status: Never    Smokeless tobacco: Never   Substance and Sexual Activity    Alcohol use: Never    Drug use: Never    Sexual activity: Not Currently     Social Determinants of Health     Financial Resource Strain: Low Risk  (1/1/2024)    Overall Financial Resource Strain (CARDIA)     Difficulty of Paying Living Expenses: Not hard at all   Food Insecurity: No Food Insecurity (1/1/2024)    Hunger Vital Sign     Worried About Running Out of Food in the Last Year: Never true     Ran Out of Food in the Last Year: Never true   Transportation Needs: No Transportation Needs (1/1/2024)    PRAPARE - Transportation     Lack of Transportation (Medical): No     Lack of Transportation (Non-Medical): No   Physical Activity: Inactive (1/1/2024)    Exercise Vital Sign     Days of Exercise per Week: 0 days     Minutes of Exercise  "per Session: 0 min   Stress: No Stress Concern Present (1/1/2024)    [employer] [place] [employer] - Occupational Stress Questionnaire     Feeling of Stress : Only a little   Social Connections: Moderately Isolated (1/1/2024)    Social Connection and Isolation Panel [NHANES]     Frequency of Communication with Friends and Family: More than three times a week     Frequency of Social Gatherings with Friends and Family: Once a week     Attends Taoism Services: Never     Active Member of Clubs or Organizations: Yes     Attends Club or Organization Meetings: More than 4 times per year     Marital Status:    Housing Stability: Low Risk  (1/1/2024)    Housing Stability Vital Sign     Unable to Pay for Housing in the Last Year: No     Number of Places Lived in the Last Year: 0     Unstable Housing in the Last Year: No     Patient Care Team:  Nancy Mclaughlin MD as PCP - General (Family Medicine)  Lily Prater MD (Obstetrics and Gynecology)   Subjective:   ROS  See HPI for details  All Other ROS: Negative except as stated in HPI.   Objective:   BP (!) 145/75   Pulse 81   Temp 99 °F (37.2 °C)   Ht 5' 3" (1.6 m)   Wt 101.6 kg (223 lb 14.4 oz)   SpO2 96%   BMI 39.66 kg/m²   Physical Exam  Constitutional:       Appearance: She is obese.   Cardiovascular:      Rate and Rhythm: Normal rate and regular rhythm.      Heart sounds: Normal heart sounds.   Pulmonary:      Effort: Pulmonary effort is normal.   Skin:     Comments: 1mm cherry angiomas throughout face   Neurological:      Mental Status: She is alert.       Assessment:       ICD-10-CM ICD-9-CM   1. Primary hypertension  I10 401.9   2. Morbid (severe) obesity due to excess calories  E66.01 278.01   3. Cherry angioma  D18.01 228.01   4. Hypothyroidism, unspecified type  E03.9 244.9      Plan:   1. Primary hypertension  Assessment & Plan:  Blood pressure not at goal <140/90 (<130/80 if otherwise noted)  Increase HCTZ to 50mg qd  Record BP " at home and call if still greater than 140/90.  Nurse visit in 1 week for BP check  Recommend DASH diet  Avoid tobacco use  Record BP at home daily and bring log to next office visit, assure that home cuff is calibrated at minimum every 12 months      Orders:  -     hydroCHLOROthiazide (HYDRODIURIL) 50 MG tablet; Take 1 tablet (50 mg total) by mouth once daily.  Dispense: 30 tablet; Refill: 3    2. Morbid (severe) obesity due to excess calories  Assessment & Plan:  Ozempic too costly  Start Orlistat tid. AE discussed    Orders:  -     orlistat (LOC) 60 MG capsule; Take 1 capsule (60 mg total) by mouth 3 (three) times daily with meals.  Dispense: 90 capsule; Refill: 11    3. Cherry angioma  Assessment & Plan:  Obtain PA per insurance then schedule cry/dermatology referral or topical      4. Hypothyroidism, unspecified type  Assessment & Plan:  Currently stable and asymptomatic  Continue current Rx and f/u with rpt labs in 1 month  In order for medication to be effective, recommend patient to take Rx once a day, every day at the same time before breakfast. Recommend that patient takes medicine with only water and empty stomach. Must wait 30 minutes to 1 hour before eating or drinking anything other than water.  Report any symptoms of thinning hair, breaking nails, fatigue, weight gain or loss, palpitations.   ED precautions for worsening of symptoms      Orders:  -     T4, Free; Future; Expected date: 01/04/2024  -     TSH; Future; Expected date: 01/04/2024         Medication List with Changes/Refills   New Medications    ORLISTAT (LOC) 60 MG CAPSULE    Take 1 capsule (60 mg total) by mouth 3 (three) times daily with meals.       Start Date: 1/4/2024  End Date: 1/3/2025   Current Medications    ALBUTEROL (PROAIR HFA) 90 MCG/ACTUATION INHALER    Inhale 2 puffs into the lungs every 6 (six) hours as needed for Wheezing or Shortness of Breath. Rescue       Start Date: 11/21/2023End Date: --    AMITRIPTYLINE (ELAVIL) 25  MG TABLET    Take 1 tablet (25 mg total) by mouth 2 (two) times a day.       Start Date: 11/21/2023End Date: 11/20/2024    ASPIRIN-ACETAMINOPHEN-CAFFEINE 250-250-65 MG (EXCEDRIN MIGRAINE) 250-250-65 MG PER TABLET    Take 1 tablet by mouth every 6 (six) hours as needed.       Start Date: --        End Date: --    BENAZEPRIL (LOTENSIN) 40 MG TABLET    See Instructions, TAKE 1 TABLET BY MOUTH DAILY, # 90 tab(s), 3 Refill(s), Pharmacy: [facility] #[id number], 162, cm, Height/Length Dosing, 09/14/21 8:45:00 CDT, 101.85, kg, Weight Dosing, 09/14/21 8:45:00 CDT       Start Date: 12/27/2022End Date: --    BIOTIN 1 MG TABLET    Take 1,000 mcg by mouth.       Start Date: --        End Date: --    CALCIUM CITRATE (CALCITRATE) 200 MG (950 MG) TABLET    Take 2 tablets by mouth once daily.       Start Date: --        End Date: --    DIPHENHYDRAMINE (BENADRYL) 25 MG CAPSULE    Take 25 mg by mouth.       Start Date: --        End Date: --    DOXYCYCLINE (PERIOSTAT) 20 MG TABLET           Start Date: 12/13/2023End Date: --    KETOCONAZOLE (NIZORAL) 2 % CREAM    APPLY TO FACE TWICE DAILY UNTIL CLEAR       Start Date: 2/24/2022 End Date: --    MONTELUKAST (SINGULAIR) 10 MG TABLET    TAKE 1 TABLET BY MOUTH DAILY       Start Date: 3/8/2023  End Date: --    PIMECROLIMUS (ELIDEL) 1 % CREAM           Start Date: 12/20/2023End Date: --    SEMAGLUTIDE, WEIGHT LOSS, 0.25 MG/0.5 ML PNIJ    Inject 0.25 mg into the skin every 7 days.       Start Date: 11/22/2023End Date: --    THYROID, PORK, (ARMOUR THYROID) 120 MG TAB    Take 1 tablet (120 mg total) by mouth before breakfast.       Start Date: 8/1/2022  End Date: --    THYROID, PORK, (ARMOUR THYROID) 120 MG TAB    Take 1 tablet (120 mg total) by mouth once daily.       Start Date: 11/2/2023 End Date: --    THYROID, PORK, (ARMOUR THYROID) 15 MG TAB    Take 1 tablet (15 mg total) by mouth before breakfast.       Start Date: 12/4/2023 End Date: --    TIZANIDINE (ZANAFLEX) 4 MG TABLET    Take  4 mg by mouth every 8 (eight) hours as needed.       Start Date: 8/22/2023 End Date: --    TRAZODONE (DESYREL) 50 MG TABLET    TAKE 1 TABLET BY MOUTH EVERY DAY AT BEDTIME AS NEEDED FOR SLEEP       Start Date: 8/15/2022 End Date: --    VALACYCLOVIR (VALTREX) 500 MG TABLET    Take 1 tablet (500 mg total) by mouth 2 (two) times daily.       Start Date: 11/21/2023End Date: --   Changed and/or Refilled Medications    Modified Medication Previous Medication    HYDROCHLOROTHIAZIDE (HYDRODIURIL) 50 MG TABLET hydroCHLOROthiazide (HYDRODIURIL) 25 MG tablet       Take 1 tablet (50 mg total) by mouth once daily.    Take 1 tablet (25 mg total) by mouth once daily.       Start Date: 1/4/2024  End Date: --    Start Date: 11/27/2023End Date: 1/4/2024        Follow up in about 6 weeks (around 2/15/2024) for Weight loss, Thyroid, with labs, 1 week nurse visit BP check. In addition to their scheduled follow up, the patient has also been instructed to follow up on as needed basis.

## 2024-01-04 ENCOUNTER — OFFICE VISIT (OUTPATIENT)
Dept: PRIMARY CARE CLINIC | Facility: CLINIC | Age: 74
End: 2024-01-04
Payer: MEDICARE

## 2024-01-04 VITALS
WEIGHT: 223.88 LBS | BODY MASS INDEX: 39.67 KG/M2 | HEART RATE: 81 BPM | OXYGEN SATURATION: 96 % | TEMPERATURE: 99 F | HEIGHT: 63 IN | SYSTOLIC BLOOD PRESSURE: 145 MMHG | DIASTOLIC BLOOD PRESSURE: 75 MMHG

## 2024-01-04 DIAGNOSIS — I10 PRIMARY HYPERTENSION: Primary | ICD-10-CM

## 2024-01-04 DIAGNOSIS — E66.01 MORBID (SEVERE) OBESITY DUE TO EXCESS CALORIES: ICD-10-CM

## 2024-01-04 DIAGNOSIS — E03.9 HYPOTHYROIDISM, UNSPECIFIED TYPE: ICD-10-CM

## 2024-01-04 DIAGNOSIS — D18.01 CHERRY ANGIOMA: ICD-10-CM

## 2024-01-04 PROCEDURE — 99214 OFFICE O/P EST MOD 30 MIN: CPT | Mod: ,,, | Performed by: STUDENT IN AN ORGANIZED HEALTH CARE EDUCATION/TRAINING PROGRAM

## 2024-01-04 RX ORDER — PIMECROLIMUS 10 MG/G
CREAM TOPICAL
COMMUNITY
Start: 2023-12-20

## 2024-01-04 RX ORDER — DOXYCYCLINE HYCLATE 20 MG
TABLET ORAL
COMMUNITY
Start: 2023-12-13

## 2024-01-04 RX ORDER — HYDROCHLOROTHIAZIDE 50 MG/1
50 TABLET ORAL DAILY
Qty: 30 TABLET | Refills: 3 | Status: SHIPPED | OUTPATIENT
Start: 2024-01-04

## 2024-01-04 NOTE — ASSESSMENT & PLAN NOTE
Currently stable and asymptomatic  Continue current Rx and f/u with rpt labs in 1 month  In order for medication to be effective, recommend patient to take Rx once a day, every day at the same time before breakfast. Recommend that patient takes medicine with only water and empty stomach. Must wait 30 minutes to 1 hour before eating or drinking anything other than water.  Report any symptoms of thinning hair, breaking nails, fatigue, weight gain or loss, palpitations.   ED precautions for worsening of symptoms

## 2024-01-04 NOTE — ASSESSMENT & PLAN NOTE
Blood pressure not at goal <140/90 (<130/80 if otherwise noted)  Increase HCTZ to 50mg qd  Record BP at home and call if still greater than 140/90.  Nurse visit in 1 week for BP check  Recommend DASH diet  Avoid tobacco use  Record BP at home daily and bring log to next office visit, assure that home cuff is calibrated at minimum every 12 months

## 2024-01-11 ENCOUNTER — CLINICAL SUPPORT (OUTPATIENT)
Dept: PRIMARY CARE CLINIC | Facility: CLINIC | Age: 74
End: 2024-01-11
Payer: MEDICARE

## 2024-01-11 VITALS — DIASTOLIC BLOOD PRESSURE: 69 MMHG | SYSTOLIC BLOOD PRESSURE: 130 MMHG

## 2024-01-11 DIAGNOSIS — I10 PRIMARY HYPERTENSION: Primary | ICD-10-CM

## 2024-02-05 NOTE — PROGRESS NOTES
Date: 02/07/2024  Patient ID: [id number]   Chief Complaint: Follow-up (Follow up on HTN)    HPI:   Celena Velasco is a 73 y.o. female here today for Follow-up (Follow up on HTN)  Last visit hydrochlorothiazide was increased to 50 mg daily due to uncontrolled hypertension. She eats shrimp, which may increase htn. BP at home 150/70s. Denies ha,sob,cp,bv.  Patient was also started on orlistat, since Ozempic was too costly. Just started 2wks ago. Does eat fish, seafood, meat, fruit, boiled potatoes.  Has dermatology appt in 2 mos.    Patient Active Problem List   Diagnosis    Morbid (severe) obesity due to excess calories    Hypothyroidism    Hypertension    IGT (impaired glucose tolerance)    Wellness examination    Preoperative clearance    Insomnia    MRSA nasal colonization    Exercise-induced asthma    Urinary incontinence, post-void dribbling    HSV (herpes simplex virus) infection    Cherry angioma     Outpatient Medications Marked as Taking for the 2/7/24 encounter (Office Visit) with Nancy Mclaughlin MD   Medication Sig Dispense Refill    albuterol (PROAIR HFA) 90 mcg/actuation inhaler Inhale 2 puffs into the lungs every 6 (six) hours as needed for Wheezing or Shortness of Breath. Rescue 18 g 0    benazepriL (LOTENSIN) 40 MG tablet See Instructions, TAKE 1 TABLET BY MOUTH DAILY, # 90 tab(s), 3 Refill(s), Pharmacy: [facility] #[id number], 162, cm, Height/Length Dosing, 09/14/21 8:45:00 CDT, 101.85, kg, Weight Dosing, 09/14/21 8:45:00 CDT 90 tablet 3    biotin 1 mg tablet Take 1,000 mcg by mouth.      calcium citrate (CALCITRATE) 200 mg (950 mg) tablet Take 2 tablets by mouth once daily.      diphenhydrAMINE (BENADRYL) 25 mg capsule Take 25 mg by mouth.      hydroCHLOROthiazide (HYDRODIURIL) 50 MG tablet Take 1 tablet (50 mg total) by mouth once daily. 30 tablet 3    montelukast (SINGULAIR) 10 mg tablet TAKE 1 TABLET BY MOUTH DAILY 90 tablet 3    orlistat (LOC) 60 MG capsule Take 1 capsule (60 mg total)  by mouth 3 (three) times daily with meals. 90 capsule 11    thyroid, pork, (ARMOUR THYROID) 120 mg Tab Take 1 tablet (120 mg total) by mouth before breakfast. 90 tablet 3    thyroid, pork, (ARMOUR THYROID) 15 mg Tab Take 1 tablet (15 mg total) by mouth before breakfast. 30 tablet 2    tiZANidine (ZANAFLEX) 4 MG tablet Take 4 mg by mouth every 8 (eight) hours as needed.      valACYclovir (VALTREX) 500 MG tablet Take 1 tablet (500 mg total) by mouth 2 (two) times daily. 60 tablet 11     Past Medical History:   Diagnosis Date    Angina pectoris, unspecified 07/11/2022    HTN (hypertension)     Hypothyroidism, unspecified     Insomnia     CURTIS (obstructive sleep apnea)     Vitamin D deficiency      Past Surgical History:   Procedure Laterality Date    cataract surgery  2021    [place]    DILATION AND CURETTAGE OF UTERUS      ROBOTIC FUSION,SPINE,LUMBAR,TLIF  10/26/2022    with bone graft    TONSILLECTOMY       Review of patient's allergies indicates:   Allergen Reactions    Adhesive Dermatitis     SKIN PATCHES     History reviewed. No pertinent family history.   Social History     Socioeconomic History    Marital status:    Tobacco Use    Smoking status: Never    Smokeless tobacco: Never   Substance and Sexual Activity    Alcohol use: Never    Drug use: Never    Sexual activity: Not Currently     Social Determinants of Health     Financial Resource Strain: Low Risk  (1/1/2024)    Overall Financial Resource Strain (CARDIA)     Difficulty of Paying Living Expenses: Not hard at all   Food Insecurity: No Food Insecurity (1/1/2024)    Hunger Vital Sign     Worried About Running Out of Food in the Last Year: Never true     Ran Out of Food in the Last Year: Never true   Transportation Needs: No Transportation Needs (1/1/2024)    PRAPARE - Transportation     Lack of Transportation (Medical): No     Lack of Transportation (Non-Medical): No   Physical Activity: Inactive (1/1/2024)    Exercise Vital Sign     Days of Exercise  "per Week: 0 days     Minutes of Exercise per Session: 0 min   Stress: No Stress Concern Present (1/1/2024)    [employer] [place] [employer] - Occupational Stress Questionnaire     Feeling of Stress : Only a little   Social Connections: Moderately Isolated (1/1/2024)    Social Connection and Isolation Panel [NHANES]     Frequency of Communication with Friends and Family: More than three times a week     Frequency of Social Gatherings with Friends and Family: Once a week     Attends Mu-ism Services: Never     Active Member of Clubs or Organizations: Yes     Attends Club or Organization Meetings: More than 4 times per year     Marital Status:    Housing Stability: Low Risk  (1/1/2024)    Housing Stability Vital Sign     Unable to Pay for Housing in the Last Year: No     Number of Places Lived in the Last Year: 0     Unstable Housing in the Last Year: No     Patient Care Team:  Nancy Mclaughlin MD as PCP - General (Family Medicine)  Lily Prater MD (Obstetrics and Gynecology)   Subjective:   ROS  See HPI for details  All Other ROS: Negative except as stated in HPI.   Objective:   BP (!) 156/68   Pulse 71   Ht 5' 3" (1.6 m)   Wt 103.1 kg (227 lb 3.2 oz)   SpO2 95%   BMI 40.25 kg/m²   Physical Exam  Constitutional:       Appearance: She is obese.   Cardiovascular:      Rate and Rhythm: Normal rate and regular rhythm.      Heart sounds: Normal heart sounds.   Pulmonary:      Effort: Pulmonary effort is normal.   Neurological:      Mental Status: She is alert and oriented to person, place, and time.       Assessment:       ICD-10-CM ICD-9-CM   1. Morbid (severe) obesity due to excess calories  E66.01 278.01   2. Hypothyroidism, unspecified type  E03.9 244.9   3. Primary hypertension  I10 401.9      Plan:   1. Morbid (severe) obesity due to excess calories  Overview:  Ozempic was not covered by insurance    Assessment & Plan:  Continue orlistat  Continue lifestyle modifications      2. " Hypothyroidism, unspecified type  Assessment & Plan:  Obtain TFTs and adjust medication as necessary.  For now continue [place] thyroid 135 mg daily    Orders:  -     T4, Free; Future; Expected date: 02/07/2024  -     TSH; Future; Expected date: 02/07/2024  -     T3, Free (OLG); Future; Expected date: 02/07/2024    3. Primary hypertension  Assessment & Plan:  Blood pressure not at goal <140/90 (<130/80 if otherwise noted)  Increase HCTZ to 75 mg daily.  Continue benazepril 40 mg daily.     Record BP at home and call if still greater than 140/90.  Nurse visit in 1 week for BP check  Recommend DASH diet  Avoid tobacco use  Record BP at home daily and bring log to next office visit, assure that home cuff is calibrated at minimum every 12 months      Orders:  -     hydroCHLOROthiazide (HYDRODIURIL) 25 MG tablet; Take 1 tablet (25 mg total) by mouth once daily.  Dispense: 30 tablet; Refill: 11         Medication List with Changes/Refills   New Medications    HYDROCHLOROTHIAZIDE (HYDRODIURIL) 25 MG TABLET    Take 1 tablet (25 mg total) by mouth once daily.       Start Date: 2/7/2024  End Date: 2/6/2025   Current Medications    ALBUTEROL (PROAIR HFA) 90 MCG/ACTUATION INHALER    Inhale 2 puffs into the lungs every 6 (six) hours as needed for Wheezing or Shortness of Breath. Rescue       Start Date: 11/21/2023End Date: --    AMITRIPTYLINE (ELAVIL) 25 MG TABLET    Take 1 tablet (25 mg total) by mouth 2 (two) times a day.       Start Date: 11/21/2023End Date: 11/20/2024    ASPIRIN-ACETAMINOPHEN-CAFFEINE 250-250-65 MG (EXCEDRIN MIGRAINE) 250-250-65 MG PER TABLET    Take 1 tablet by mouth every 6 (six) hours as needed.       Start Date: --        End Date: --    BENAZEPRIL (LOTENSIN) 40 MG TABLET    See Instructions, TAKE 1 TABLET BY MOUTH DAILY, # 90 tab(s), 3 Refill(s), Pharmacy: [facility] #[id number], 162, cm, Height/Length Dosing, 09/14/21 8:45:00 CDT, 101.85, kg, Weight Dosing, 09/14/21 8:45:00 CDT       Start Date:  12/27/2022End Date: --    BIOTIN 1 MG TABLET    Take 1,000 mcg by mouth.       Start Date: --        End Date: --    CALCIUM CITRATE (CALCITRATE) 200 MG (950 MG) TABLET    Take 2 tablets by mouth once daily.       Start Date: --        End Date: --    DIPHENHYDRAMINE (BENADRYL) 25 MG CAPSULE    Take 25 mg by mouth.       Start Date: --        End Date: --    DOXYCYCLINE (PERIOSTAT) 20 MG TABLET           Start Date: 12/13/2023End Date: --    HYDROCHLOROTHIAZIDE (HYDRODIURIL) 50 MG TABLET    Take 1 tablet (50 mg total) by mouth once daily.       Start Date: 1/4/2024  End Date: --    KETOCONAZOLE (NIZORAL) 2 % CREAM    APPLY TO FACE TWICE DAILY UNTIL CLEAR       Start Date: 2/24/2022 End Date: --    MONTELUKAST (SINGULAIR) 10 MG TABLET    TAKE 1 TABLET BY MOUTH DAILY       Start Date: 3/8/2023  End Date: --    ORLISTAT (LOC) 60 MG CAPSULE    Take 1 capsule (60 mg total) by mouth 3 (three) times daily with meals.       Start Date: 1/4/2024  End Date: 1/3/2025    PIMECROLIMUS (ELIDEL) 1 % CREAM           Start Date: 12/20/2023End Date: --    SEMAGLUTIDE, WEIGHT LOSS, 0.25 MG/0.5 ML PNIJ    Inject 0.25 mg into the skin every 7 days.       Start Date: 11/22/2023End Date: --    THYROID, PORK, (ARMOUR THYROID) 120 MG TAB    Take 1 tablet (120 mg total) by mouth before breakfast.       Start Date: 8/1/2022  End Date: --    THYROID, PORK, (ARMOUR THYROID) 120 MG TAB    Take 1 tablet (120 mg total) by mouth once daily.       Start Date: 11/2/2023 End Date: --    THYROID, PORK, (ARMOUR THYROID) 15 MG TAB    Take 1 tablet (15 mg total) by mouth before breakfast.       Start Date: 12/4/2023 End Date: --    TIZANIDINE (ZANAFLEX) 4 MG TABLET    Take 4 mg by mouth every 8 (eight) hours as needed.       Start Date: 8/22/2023 End Date: --    TRAZODONE (DESYREL) 50 MG TABLET    TAKE 1 TABLET BY MOUTH EVERY DAY AT BEDTIME AS NEEDED FOR SLEEP       Start Date: 8/15/2022 End Date: --    VALACYCLOVIR (VALTREX) 500 MG TABLET    Take 1  tablet (500 mg total) by mouth 2 (two) times daily.       Start Date: 11/21/2023End Date: --        Follow up in about 4 weeks (around 3/6/2024) for HTN, Weight loss, Thyroid, with labs, 1 week nurse visit BP check. In addition to their scheduled follow up, the patient has also been instructed to follow up on as needed basis.

## 2024-02-07 ENCOUNTER — OFFICE VISIT (OUTPATIENT)
Dept: PRIMARY CARE CLINIC | Facility: CLINIC | Age: 74
End: 2024-02-07
Payer: MEDICARE

## 2024-02-07 VITALS
BODY MASS INDEX: 40.25 KG/M2 | HEART RATE: 71 BPM | WEIGHT: 227.19 LBS | DIASTOLIC BLOOD PRESSURE: 72 MMHG | OXYGEN SATURATION: 95 % | SYSTOLIC BLOOD PRESSURE: 126 MMHG | HEIGHT: 63 IN

## 2024-02-07 DIAGNOSIS — I10 PRIMARY HYPERTENSION: ICD-10-CM

## 2024-02-07 DIAGNOSIS — E03.9 HYPOTHYROIDISM, UNSPECIFIED TYPE: ICD-10-CM

## 2024-02-07 DIAGNOSIS — E66.01 MORBID (SEVERE) OBESITY DUE TO EXCESS CALORIES: Primary | ICD-10-CM

## 2024-02-07 PROCEDURE — 99214 OFFICE O/P EST MOD 30 MIN: CPT | Mod: ,,, | Performed by: STUDENT IN AN ORGANIZED HEALTH CARE EDUCATION/TRAINING PROGRAM

## 2024-02-07 RX ORDER — HYDROCHLOROTHIAZIDE 25 MG/1
25 TABLET ORAL DAILY
Qty: 30 TABLET | Refills: 11 | Status: SHIPPED | OUTPATIENT
Start: 2024-02-07 | End: 2024-03-06

## 2024-02-07 NOTE — ASSESSMENT & PLAN NOTE
Blood pressure not at goal <140/90 (<130/80 if otherwise noted)  Increase HCTZ to 75 mg daily.  Continue benazepril 40 mg daily.     Record BP at home and call if still greater than 140/90.  Nurse visit in 1 week for BP check  Recommend DASH diet  Avoid tobacco use  Record BP at home daily and bring log to next office visit, assure that home cuff is calibrated at minimum every 12 months

## 2024-02-07 NOTE — ASSESSMENT & PLAN NOTE
Obtain TFTs and adjust medication as necessary.  For now continue [place] thyroid 135 mg daily

## 2024-02-12 PROBLEM — Z00.00 WELLNESS EXAMINATION: Status: RESOLVED | Noted: 2022-09-13 | Resolved: 2024-02-12

## 2024-02-28 DIAGNOSIS — E03.9 HYPOTHYROIDISM, UNSPECIFIED TYPE: ICD-10-CM

## 2024-02-28 RX ORDER — THYROID 120 MG/1
120 TABLET ORAL DAILY
Qty: 90 TABLET | Refills: 3 | Status: SHIPPED | OUTPATIENT
Start: 2024-02-28 | End: 2024-03-01 | Stop reason: SDUPTHER

## 2024-02-29 NOTE — PROGRESS NOTES
Date: 03/06/2024  Patient ID: [id number]   Chief Complaint: Follow-up (Htn, weight loss, did not complete thyroid labs to discuss)    HPI:   Celena Velasco is a 73 y.o. female here today for Follow-up (Htn, weight loss, did not complete thyroid labs to discuss)  Last visit patient was continued on orlistat, hydrochlorothiazide was increased to 75 mg daily for uncontrolled HTN. Patient had GI upset and back pain from Dieudonne. Still having difficulty losing weight and maintaining diet. Because of these symptoms, BP at home labile. Compliant with BP meds.    Patient Active Problem List   Diagnosis    Morbid (severe) obesity due to excess calories    Hypothyroidism    Hypertension    IGT (impaired glucose tolerance)    Preoperative clearance    Insomnia    MRSA nasal colonization    Exercise-induced asthma    Urinary incontinence, post-void dribbling    HSV (herpes simplex virus) infection    Cherry angioma     Outpatient Medications Marked as Taking for the 3/6/24 encounter (Office Visit) with Nancy Mclaughlin MD   Medication Sig Dispense Refill    albuterol (PROAIR HFA) 90 mcg/actuation inhaler Inhale 2 puffs into the lungs every 6 (six) hours as needed for Wheezing or Shortness of Breath. Rescue 18 g 0    amitriptyline (ELAVIL) 25 MG tablet Take 1 tablet (25 mg total) by mouth 2 (two) times a day. 60 tablet 3    benazepriL (LOTENSIN) 40 MG tablet See Instructions, TAKE 1 TABLET BY MOUTH DAILY, # 90 tab(s), 3 Refill(s), Pharmacy: [facility] #[id number], 162, cm, Height/Length Dosing, 09/14/21 8:45:00 CDT, 101.85, kg, Weight Dosing, 09/14/21 8:45:00 CDT 90 tablet 3    biotin 1 mg tablet Take 1,000 mcg by mouth.      calcium citrate (CALCITRATE) 200 mg (950 mg) tablet Take 2 tablets by mouth once daily.      diphenhydrAMINE (BENADRYL) 25 mg capsule Take 25 mg by mouth.      hydroCHLOROthiazide (HYDRODIURIL) 50 MG tablet Take 1 tablet (50 mg total) by mouth once daily. 30 tablet 3    ketoconazole (NIZORAL) 2 %  cream APPLY TO FACE TWICE DAILY UNTIL CLEAR      montelukast (SINGULAIR) 10 mg tablet TAKE 1 TABLET BY MOUTH DAILY 90 tablet 3    thyroid, pork, (ARMOUR THYROID) 120 mg Tab Take 1 tablet (120 mg total) by mouth once daily. 90 tablet 3    thyroid, pork, (ARMOUR THYROID) 15 mg Tab Take 1 tablet (15 mg total) by mouth before breakfast. 30 tablet 2    tiZANidine (ZANAFLEX) 4 MG tablet Take 4 mg by mouth every 8 (eight) hours as needed.      traZODone (DESYREL) 50 MG tablet TAKE 1 TABLET BY MOUTH EVERY DAY AT BEDTIME AS NEEDED FOR SLEEP 30 tablet 11    valACYclovir (VALTREX) 500 MG tablet Take 1 tablet (500 mg total) by mouth 2 (two) times daily. 60 tablet 11     Past Medical History:   Diagnosis Date    Angina pectoris, unspecified 07/11/2022    HTN (hypertension)     Hypothyroidism, unspecified     Insomnia     CURTIS (obstructive sleep apnea)     Vitamin D deficiency      Past Surgical History:   Procedure Laterality Date    cataract surgery  2021    [place]    DILATION AND CURETTAGE OF UTERUS      ROBOTIC FUSION,SPINE,LUMBAR,TLIF  10/26/2022    with bone graft    SPINE SURGERY  Oct 26, 2022    Lumbar Surgery    TONSILLECTOMY       Review of patient's allergies indicates:   Allergen Reactions    Adhesive Dermatitis     SKIN PATCHES     Family History   Problem Relation Age of Onset    Heart disease Mother     Kidney disease Mother     COPD Father       Social History     Socioeconomic History    Marital status:    Tobacco Use    Smoking status: Never    Smokeless tobacco: Never   Substance and Sexual Activity    Alcohol use: Never    Drug use: Never    Sexual activity: Not Currently     Birth control/protection: Other-see comments     Comment: Menopause     Social Determinants of Health     Financial Resource Strain: Low Risk  (1/1/2024)    Overall Financial Resource Strain (CARDIA)     Difficulty of Paying Living Expenses: Not hard at all   Food Insecurity: No Food Insecurity (1/1/2024)    Hunger Vital Sign      "Worried About Running Out of Food in the Last Year: Never true     Ran Out of Food in the Last Year: Never true   Transportation Needs: No Transportation Needs (1/1/2024)    PRAPARE - Transportation     Lack of Transportation (Medical): No     Lack of Transportation (Non-Medical): No   Physical Activity: Inactive (1/1/2024)    Exercise Vital Sign     Days of Exercise per Week: 0 days     Minutes of Exercise per Session: 0 min   Stress: No Stress Concern Present (1/1/2024)    [place] [employer] - Occupational Stress Questionnaire     Feeling of Stress : Only a little   Social Connections: Moderately Isolated (1/1/2024)    Social Connection and Isolation Panel [NHANES]     Frequency of Communication with Friends and Family: More than three times a week     Frequency of Social Gatherings with Friends and Family: Once a week     Attends Roman Catholic Services: Never     Active Member of Clubs or Organizations: Yes     Attends Club or Organization Meetings: More than 4 times per year     Marital Status:    Housing Stability: Low Risk  (1/1/2024)    Housing Stability Vital Sign     Unable to Pay for Housing in the Last Year: No     Number of Places Lived in the Last Year: 0     Unstable Housing in the Last Year: No     Patient Care Team:  Nancy Mclaughlin MD as PCP - General (Family Medicine)  iLly Prater MD (Obstetrics and Gynecology)  Samra Hayward MD as Consulting Physician (Dermatology)   Subjective:   ROS  See HPI for details  All Other ROS: Negative except as stated in HPI.   Objective:   BP (!) 161/84   Pulse 63   Resp 18   Ht 5' 3" (1.6 m)   Wt 103 kg (227 lb)   SpO2 95%   BMI 40.21 kg/m²   Physical Exam  Constitutional:       Appearance: She is obese.   Cardiovascular:      Rate and Rhythm: Normal rate and regular rhythm.      Heart sounds: Normal heart sounds.   Pulmonary:      Effort: Pulmonary effort is normal.   Neurological:      Mental Status: She is " alert and oriented to person, place, and time.       Assessment:       ICD-10-CM ICD-9-CM   1. Primary hypertension  I10 401.9   2. Hypothyroidism, unspecified type  E03.9 244.9   3. Morbid (severe) obesity due to excess calories  E66.01 278.01      Plan:   1. Primary hypertension  Overview:  CCB caused edema    Assessment & Plan:  Continue current medication regimen  Consider increasing HCTZ   Recommend DASH diet  Avoid tobacco use  Record BP at home daily and bring log to next office visit, assure that home cuff is calibrated at minimum every 12 months        2. Hypothyroidism, unspecified type  Assessment & Plan:  Obtain TFTs and adjust medication as necessary.      3. Morbid (severe) obesity due to excess calories  Overview:  Ozempic was not covered by insurance  Dieudonne caused pain and aches    Assessment & Plan:  Attempt Wellbutrin + Naltrexone. AE discussed  Lifestyle modifications    Orders:  -     buPROPion (WELLBUTRIN XL) 150 MG TB24 tablet; Take 1 tablet (150 mg total) by mouth once daily.  Dispense: 30 tablet; Refill: 11  -     naltrexone capsule; Take 1 capsule (4.5 mg total) by mouth every evening.  Dispense: 30 capsule; Refill: 11         Medication List with Changes/Refills   New Medications    BUPROPION (WELLBUTRIN XL) 150 MG TB24 TABLET    Take 1 tablet (150 mg total) by mouth once daily.       Start Date: 3/6/2024  End Date: 3/6/2025    NALTREXONE CAPSULE    Take 1 capsule (4.5 mg total) by mouth every evening.       Start Date: 3/6/2024  End Date: --   Current Medications    ALBUTEROL (PROAIR HFA) 90 MCG/ACTUATION INHALER    Inhale 2 puffs into the lungs every 6 (six) hours as needed for Wheezing or Shortness of Breath. Rescue       Start Date: 11/21/2023End Date: --    AMITRIPTYLINE (ELAVIL) 25 MG TABLET    Take 1 tablet (25 mg total) by mouth 2 (two) times a day.       Start Date: 11/21/2023End Date: 11/20/2024    BENAZEPRIL (LOTENSIN) 40 MG TABLET    See Instructions, TAKE 1 TABLET BY MOUTH  DAILY, # 90 tab(s), 3 Refill(s), Pharmacy: [facility] #[id number], 162, cm, Height/Length Dosing, 09/14/21 8:45:00 CDT, 101.85, kg, Weight Dosing, 09/14/21 8:45:00 CDT       Start Date: 12/27/2022End Date: --    BIOTIN 1 MG TABLET    Take 1,000 mcg by mouth.       Start Date: --        End Date: --    CALCIUM CITRATE (CALCITRATE) 200 MG (950 MG) TABLET    Take 2 tablets by mouth once daily.       Start Date: --        End Date: --    DIPHENHYDRAMINE (BENADRYL) 25 MG CAPSULE    Take 25 mg by mouth.       Start Date: --        End Date: --    HYDROCHLOROTHIAZIDE (HYDRODIURIL) 50 MG TABLET    Take 1 tablet (50 mg total) by mouth once daily.       Start Date: 1/4/2024  End Date: --    KETOCONAZOLE (NIZORAL) 2 % CREAM    APPLY TO FACE TWICE DAILY UNTIL CLEAR       Start Date: 2/24/2022 End Date: --    MONTELUKAST (SINGULAIR) 10 MG TABLET    TAKE 1 TABLET BY MOUTH DAILY       Start Date: 3/8/2023  End Date: --    THYROID, PORK, (ARMOUR THYROID) 120 MG TAB    Take 1 tablet (120 mg total) by mouth once daily.       Start Date: 3/1/2024  End Date: --    THYROID, PORK, (ARMOUR THYROID) 15 MG TAB    Take 1 tablet (15 mg total) by mouth before breakfast.       Start Date: 12/4/2023 End Date: --    TIZANIDINE (ZANAFLEX) 4 MG TABLET    Take 4 mg by mouth every 8 (eight) hours as needed.       Start Date: 8/22/2023 End Date: --    TRAZODONE (DESYREL) 50 MG TABLET    TAKE 1 TABLET BY MOUTH EVERY DAY AT BEDTIME AS NEEDED FOR SLEEP       Start Date: 8/15/2022 End Date: --    VALACYCLOVIR (VALTREX) 500 MG TABLET    Take 1 tablet (500 mg total) by mouth 2 (two) times daily.       Start Date: 11/21/2023End Date: --   Discontinued Medications    ASPIRIN-ACETAMINOPHEN-CAFFEINE 250-250-65 MG (EXCEDRIN MIGRAINE) 250-250-65 MG PER TABLET    Take 1 tablet by mouth every 6 (six) hours as needed.       Start Date: --        End Date: 3/6/2024    DOXYCYCLINE (PERIOSTAT) 20 MG TABLET           Start Date: 12/13/2023End Date: 3/6/2024     HYDROCHLOROTHIAZIDE (HYDRODIURIL) 25 MG TABLET    Take 1 tablet (25 mg total) by mouth once daily.       Start Date: 2/7/2024  End Date: 3/6/2024    ORLISTAT (LOC) 60 MG CAPSULE    Take 1 capsule (60 mg total) by mouth 3 (three) times daily with meals.       Start Date: 1/4/2024  End Date: 3/6/2024    PIMECROLIMUS (ELIDEL) 1 % CREAM           Start Date: 12/20/2023End Date: 3/6/2024    SEMAGLUTIDE, WEIGHT LOSS, 0.25 MG/0.5 ML PNIJ    Inject 0.25 mg into the skin every 7 days.       Start Date: 11/22/2023End Date: 3/6/2024    THYROID, PORK, (ARMOUR THYROID) 120 MG TAB    Take 1 tablet (120 mg total) by mouth before breakfast.       Start Date: 8/1/2022  End Date: 3/6/2024        Follow up in about 4 weeks (around 4/3/2024). In addition to their scheduled follow up, the patient has also been instructed to follow up on as needed basis.

## 2024-03-01 DIAGNOSIS — E03.9 HYPOTHYROIDISM, UNSPECIFIED TYPE: ICD-10-CM

## 2024-03-01 RX ORDER — THYROID 120 MG/1
120 TABLET ORAL DAILY
Qty: 90 TABLET | Refills: 3 | Status: SHIPPED | OUTPATIENT
Start: 2024-03-01

## 2024-03-06 ENCOUNTER — OFFICE VISIT (OUTPATIENT)
Dept: PRIMARY CARE CLINIC | Facility: CLINIC | Age: 74
End: 2024-03-06
Payer: MEDICARE

## 2024-03-06 VITALS
WEIGHT: 227 LBS | SYSTOLIC BLOOD PRESSURE: 161 MMHG | OXYGEN SATURATION: 95 % | DIASTOLIC BLOOD PRESSURE: 81 MMHG | BODY MASS INDEX: 40.22 KG/M2 | RESPIRATION RATE: 18 BRPM | HEIGHT: 63 IN | HEART RATE: 63 BPM

## 2024-03-06 DIAGNOSIS — I10 PRIMARY HYPERTENSION: Primary | ICD-10-CM

## 2024-03-06 DIAGNOSIS — E66.01 MORBID (SEVERE) OBESITY DUE TO EXCESS CALORIES: ICD-10-CM

## 2024-03-06 DIAGNOSIS — E03.9 HYPOTHYROIDISM, UNSPECIFIED TYPE: ICD-10-CM

## 2024-03-06 PROCEDURE — 99214 OFFICE O/P EST MOD 30 MIN: CPT | Mod: ,,, | Performed by: STUDENT IN AN ORGANIZED HEALTH CARE EDUCATION/TRAINING PROGRAM

## 2024-03-06 RX ORDER — BUPROPION HYDROCHLORIDE 150 MG/1
150 TABLET ORAL DAILY
Qty: 30 TABLET | Refills: 11 | Status: SHIPPED | OUTPATIENT
Start: 2024-03-06 | End: 2025-03-06

## 2024-03-06 NOTE — ASSESSMENT & PLAN NOTE
Continue current medication regimen  Consider increasing HCTZ   Recommend DASH diet  Avoid tobacco use  Record BP at home daily and bring log to next office visit, assure that home cuff is calibrated at minimum every 12 months

## 2024-03-11 ENCOUNTER — TELEPHONE (OUTPATIENT)
Dept: PRIMARY CARE CLINIC | Facility: CLINIC | Age: 74
End: 2024-03-11
Payer: MEDICARE

## 2024-03-11 DIAGNOSIS — E66.01 MORBID (SEVERE) OBESITY DUE TO EXCESS CALORIES: Primary | ICD-10-CM

## 2024-03-11 DIAGNOSIS — E66.01 MORBID (SEVERE) OBESITY DUE TO EXCESS CALORIES: ICD-10-CM

## 2024-03-11 DIAGNOSIS — E03.9 HYPOTHYROIDISM, UNSPECIFIED TYPE: ICD-10-CM

## 2024-03-11 RX ORDER — NALTREXONE HYDROCHLORIDE 50 MG/1
50 TABLET, FILM COATED ORAL DAILY
Qty: 30 TABLET | Refills: 2 | Status: SHIPPED | OUTPATIENT
Start: 2024-03-11

## 2024-03-11 RX ORDER — NALTREXONE HYDROCHLORIDE 50 MG/1
50 TABLET, FILM COATED ORAL DAILY
Qty: 30 TABLET | Refills: 2 | Status: SHIPPED | OUTPATIENT
Start: 2024-03-11 | End: 2024-03-11 | Stop reason: SDUPTHER

## 2024-03-11 RX ORDER — THYROID 15 MG/1
15 TABLET ORAL
Qty: 30 TABLET | Refills: 2 | Status: SHIPPED | OUTPATIENT
Start: 2024-03-11 | End: 2024-06-10

## 2024-03-11 NOTE — TELEPHONE ENCOUNTER
----- Message from Bhavani Mckeon sent at 3/11/2024 10:20 AM CDT -----  .Type:  Needs Medical Advice    Who Called: [facility]  Symptoms (please be specific):    How long has patient had these symptoms:    Pharmacy name and phone #:    Would the patient rather a call back or a response via [facility]?   Best Call Back Number: [phone]  Additional Information: naltrexone capsule only comes as 50 mg only

## 2024-04-02 NOTE — PROGRESS NOTES
Date: 04/08/2024  Patient ID: [id number]   Chief Complaint: Follow-up (With labs, change of medications)    HPI:   Celena Velasco is a 73 y.o. female here today for Follow-up (With labs, change of medications)  Wellbutrin + Naltrexone started for obesity, and cravings have decreased. Has lost 7lbs since last visit.  Recent labs showed decreased TSH, elevated T3 T4. Denies acute complaints today    Patient Active Problem List   Diagnosis    Morbid (severe) obesity due to excess calories    Hypothyroidism    Hypertension    IGT (impaired glucose tolerance)    Preoperative clearance    Insomnia    MRSA nasal colonization    Exercise-induced asthma    Urinary incontinence, post-void dribbling    HSV (herpes simplex virus) infection    Cherry angioma     Outpatient Medications Marked as Taking for the 4/8/24 encounter (Office Visit) with Nancy Mclaughlin MD   Medication Sig Dispense Refill    albuterol (PROAIR HFA) 90 mcg/actuation inhaler Inhale 2 puffs into the lungs every 6 (six) hours as needed for Wheezing or Shortness of Breath. Rescue 18 g 0    amitriptyline (ELAVIL) 25 MG tablet Take 1 tablet (25 mg total) by mouth 2 (two) times a day. 60 tablet 3    benazepriL (LOTENSIN) 40 MG tablet See Instructions, TAKE 1 TABLET BY MOUTH DAILY, # 90 tab(s), 3 Refill(s), Pharmacy: [facility] #[id number], 162, cm, Height/Length Dosing, 09/14/21 8:45:00 CDT, 101.85, kg, Weight Dosing, 09/14/21 8:45:00 CDT 90 tablet 3    biotin 1 mg tablet Take 1,000 mcg by mouth.      buPROPion (WELLBUTRIN XL) 150 MG TB24 tablet Take 1 tablet (150 mg total) by mouth once daily. 30 tablet 11    calcium citrate (CALCITRATE) 200 mg (950 mg) tablet Take 2 tablets by mouth once daily.      diphenhydrAMINE (BENADRYL) 25 mg capsule Take 25 mg by mouth.      hydroCHLOROthiazide (HYDRODIURIL) 50 MG tablet Take 1 tablet (50 mg total) by mouth once daily. 30 tablet 3    ketoconazole (NIZORAL) 2 % cream APPLY TO FACE TWICE DAILY UNTIL CLEAR       montelukast (SINGULAIR) 10 mg tablet TAKE 1 TABLET BY MOUTH DAILY 90 tablet 3    naltrexone (DEPADE) 50 mg tablet Take 50 mg by mouth once daily. 30 tablet 2    thyroid, pork, (ARMOUR THYROID) 120 mg Tab Take 1 tablet (120 mg total) by mouth once daily. 90 tablet 3    thyroid, pork, (ARMOUR THYROID) 15 mg Tab Take 1 tablet (15 mg total) by mouth before breakfast. 30 tablet 2    tiZANidine (ZANAFLEX) 4 MG tablet Take 4 mg by mouth every 8 (eight) hours as needed.      traZODone (DESYREL) 50 MG tablet TAKE 1 TABLET BY MOUTH EVERY DAY AT BEDTIME AS NEEDED FOR SLEEP 30 tablet 11    valACYclovir (VALTREX) 500 MG tablet Take 1 tablet (500 mg total) by mouth 2 (two) times daily. 60 tablet 11     Past Medical History:   Diagnosis Date    Angina pectoris, unspecified 07/11/2022    HTN (hypertension)     Hypothyroidism, unspecified     Insomnia     CURTIS (obstructive sleep apnea)     Vitamin D deficiency      Past Surgical History:   Procedure Laterality Date    cataract surgery  2021    [place]    DILATION AND CURETTAGE OF UTERUS      ROBOTIC FUSION,SPINE,LUMBAR,TLIF  10/26/2022    with bone graft    SPINE SURGERY  Oct 26, 2022    Lumbar Surgery    TONSILLECTOMY       Review of patient's allergies indicates:   Allergen Reactions    Adhesive Dermatitis     SKIN PATCHES     Family History   Problem Relation Age of Onset    Heart disease Mother     Kidney disease Mother     COPD Father       Social History     Socioeconomic History    Marital status:    Tobacco Use    Smoking status: Never    Smokeless tobacco: Never   Substance and Sexual Activity    Alcohol use: Never    Drug use: Never    Sexual activity: Not Currently     Birth control/protection: Other-see comments     Comment: Menopause     Social Determinants of Health     Financial Resource Strain: Low Risk  (1/1/2024)    Overall Financial Resource Strain (CARDIA)     Difficulty of Paying Living Expenses: Not hard at all   Food Insecurity: No Food Insecurity  "(1/1/2024)    Hunger Vital Sign     Worried About Running Out of Food in the Last Year: Never true     Ran Out of Food in the Last Year: Never true   Transportation Needs: No Transportation Needs (1/1/2024)    PRAPARE - Transportation     Lack of Transportation (Medical): No     Lack of Transportation (Non-Medical): No   Physical Activity: Inactive (1/1/2024)    Exercise Vital Sign     Days of Exercise per Week: 0 days     Minutes of Exercise per Session: 0 min   Stress: No Stress Concern Present (1/1/2024)    [place] [employer] - Occupational Stress Questionnaire     Feeling of Stress : Only a little   Social Connections: Moderately Isolated (1/1/2024)    Social Connection and Isolation Panel [NHANES]     Frequency of Communication with Friends and Family: More than three times a week     Frequency of Social Gatherings with Friends and Family: Once a week     Attends Pentecostalism Services: Never     Active Member of Clubs or Organizations: Yes     Attends Club or Organization Meetings: More than 4 times per year     Marital Status:    Housing Stability: Low Risk  (1/1/2024)    Housing Stability Vital Sign     Unable to Pay for Housing in the Last Year: No     Number of Places Lived in the Last Year: 0     Unstable Housing in the Last Year: No     Patient Care Team:  Nancy Mclaughlin MD as PCP - General (Family Medicine)  Lily Prater MD (Obstetrics and Gynecology)  Samra Hayward MD as Consulting Physician (Dermatology)   Subjective:   ROS  See HPI for details  All Other ROS: Negative except as stated in HPI.   Objective:   /60   Pulse 74   Ht 5' 3" (1.6 m)   Wt 99.8 kg (220 lb)   SpO2 (!) 93%   BMI 38.97 kg/m²   Physical Exam  General: NAD. Obese   Eye: EOMI  HENT: no nasal discharge  Respiratory: non-labored breathing  Musculoskeletal: ambulates independently. No obvious deformity  Integumentary: no apparent discoloration  Neurologic: Alert, oriented to " person and situation  Cognition and Speech: Speech clear and coherent.   Psychiatric: Cooperative  Assessment:       ICD-10-CM ICD-9-CM   1. Primary hypertension  I10 401.9   2. Hypothyroidism, unspecified type  E03.9 244.9   3. Morbid (severe) obesity due to excess calories  E66.01 278.01      Plan:   1. Primary hypertension  Overview:  CCB caused edema    Assessment & Plan:  Continue current medication regimen: Benazepril 40mg qd, HCTZ 50mg qd  Blood pressure at goal <140/90 (<130/80 if otherwise noted)  Recommend DASH diet  Avoid tobacco use  Record BP at home daily and bring log to next office visit, assure that home cuff is calibrated at minimum every 12 months        2. Hypothyroidism, unspecified type  Assessment & Plan:  Decreased pork thyroid from 135 to 120mg qd. Obtain TFTs in 2mos    Orders:  -     TSH; Future; Expected date: 04/08/2024  -     T4, Free; Future; Expected date: 04/08/2024    3. Morbid (severe) obesity due to excess calories  Overview:  Ozempic was not covered by insurance  Dieudonne caused pain and aches    Assessment & Plan:  Improving  Continue Naltrexone + Wellbutrin  Continue lifestyle modifications           Medication List with Changes/Refills   Current Medications    ALBUTEROL (PROAIR HFA) 90 MCG/ACTUATION INHALER    Inhale 2 puffs into the lungs every 6 (six) hours as needed for Wheezing or Shortness of Breath. Rescue       Start Date: 11/21/2023End Date: --    AMITRIPTYLINE (ELAVIL) 25 MG TABLET    Take 1 tablet (25 mg total) by mouth 2 (two) times a day.       Start Date: 11/21/2023End Date: 11/20/2024    BENAZEPRIL (LOTENSIN) 40 MG TABLET    See Instructions, TAKE 1 TABLET BY MOUTH DAILY, # 90 tab(s), 3 Refill(s), Pharmacy: [facility] #[id number], 162, cm, Height/Length Dosing, 09/14/21 8:45:00 CDT, 101.85, kg, Weight Dosing, 09/14/21 8:45:00 CDT       Start Date: 12/27/2022End Date: --    BIOTIN 1 MG TABLET    Take 1,000 mcg by mouth.       Start Date: --        End Date: --     BUPROPION (WELLBUTRIN XL) 150 MG TB24 TABLET    Take 1 tablet (150 mg total) by mouth once daily.       Start Date: 3/6/2024  End Date: 3/6/2025    CALCIUM CITRATE (CALCITRATE) 200 MG (950 MG) TABLET    Take 2 tablets by mouth once daily.       Start Date: --        End Date: --    DIPHENHYDRAMINE (BENADRYL) 25 MG CAPSULE    Take 25 mg by mouth.       Start Date: --        End Date: --    HYDROCHLOROTHIAZIDE (HYDRODIURIL) 50 MG TABLET    Take 1 tablet (50 mg total) by mouth once daily.       Start Date: 1/4/2024  End Date: --    KETOCONAZOLE (NIZORAL) 2 % CREAM    APPLY TO FACE TWICE DAILY UNTIL CLEAR       Start Date: 2/24/2022 End Date: --    MONTELUKAST (SINGULAIR) 10 MG TABLET    TAKE 1 TABLET BY MOUTH DAILY       Start Date: 3/8/2023  End Date: --    NALTREXONE (DEPADE) 50 MG TABLET    Take 50 mg by mouth once daily.       Start Date: 3/11/2024 End Date: --    THYROID, PORK, (ARMOUR THYROID) 120 MG TAB    Take 1 tablet (120 mg total) by mouth once daily.       Start Date: 3/1/2024  End Date: --    THYROID, PORK, (ARMOUR THYROID) 15 MG TAB    Take 1 tablet (15 mg total) by mouth before breakfast.       Start Date: 3/11/2024 End Date: --    TIZANIDINE (ZANAFLEX) 4 MG TABLET    Take 4 mg by mouth every 8 (eight) hours as needed.       Start Date: 8/22/2023 End Date: --    TRAZODONE (DESYREL) 50 MG TABLET    TAKE 1 TABLET BY MOUTH EVERY DAY AT BEDTIME AS NEEDED FOR SLEEP       Start Date: 8/15/2022 End Date: --    VALACYCLOVIR (VALTREX) 500 MG TABLET    Take 1 tablet (500 mg total) by mouth 2 (two) times daily.       Start Date: 11/21/2023End Date: --        Follow up in about 2 months (around 6/8/2024) for Thyroid, With Labs, weight loss. In addition to their scheduled follow up, the patient has also been instructed to follow up on as needed basis.

## 2024-04-04 ENCOUNTER — LAB VISIT (OUTPATIENT)
Dept: LAB | Facility: HOSPITAL | Age: 74
End: 2024-04-04
Attending: STUDENT IN AN ORGANIZED HEALTH CARE EDUCATION/TRAINING PROGRAM
Payer: MEDICARE

## 2024-04-04 DIAGNOSIS — E03.9 HYPOTHYROIDISM, UNSPECIFIED TYPE: ICD-10-CM

## 2024-04-04 LAB
T3FREE SERPL-MCNC: 5.63 PG/ML (ref 1.58–3.91)
T4 FREE SERPL-MCNC: 1.72 NG/DL (ref 0.7–1.48)
TSH SERPL-ACNC: <0.008 UIU/ML (ref 0.35–4.94)

## 2024-04-04 PROCEDURE — 84481 FREE ASSAY (FT-3): CPT

## 2024-04-04 PROCEDURE — 36415 COLL VENOUS BLD VENIPUNCTURE: CPT

## 2024-04-04 PROCEDURE — 84443 ASSAY THYROID STIM HORMONE: CPT

## 2024-04-04 PROCEDURE — 84439 ASSAY OF FREE THYROXINE: CPT

## 2024-04-08 ENCOUNTER — OFFICE VISIT (OUTPATIENT)
Dept: PRIMARY CARE CLINIC | Facility: CLINIC | Age: 74
End: 2024-04-08
Payer: MEDICARE

## 2024-04-08 VITALS
SYSTOLIC BLOOD PRESSURE: 139 MMHG | WEIGHT: 220 LBS | HEIGHT: 63 IN | HEART RATE: 74 BPM | OXYGEN SATURATION: 93 % | BODY MASS INDEX: 38.98 KG/M2 | DIASTOLIC BLOOD PRESSURE: 60 MMHG

## 2024-04-08 DIAGNOSIS — I10 PRIMARY HYPERTENSION: Primary | ICD-10-CM

## 2024-04-08 DIAGNOSIS — E03.9 HYPOTHYROIDISM, UNSPECIFIED TYPE: ICD-10-CM

## 2024-04-08 DIAGNOSIS — E66.01 MORBID (SEVERE) OBESITY DUE TO EXCESS CALORIES: ICD-10-CM

## 2024-04-08 PROCEDURE — 99214 OFFICE O/P EST MOD 30 MIN: CPT | Mod: ,,, | Performed by: STUDENT IN AN ORGANIZED HEALTH CARE EDUCATION/TRAINING PROGRAM

## 2024-04-08 NOTE — ASSESSMENT & PLAN NOTE
Continue current medication regimen: Benazepril 40mg qd, HCTZ 50mg qd  Blood pressure at goal <140/90 (<130/80 if otherwise noted)  Recommend DASH diet  Avoid tobacco use  Record BP at home daily and bring log to next office visit, assure that home cuff is calibrated at minimum every 12 months

## 2024-05-07 ENCOUNTER — TELEPHONE (OUTPATIENT)
Dept: PRIMARY CARE CLINIC | Facility: CLINIC | Age: 74
End: 2024-05-07
Payer: MEDICARE

## 2024-05-07 DIAGNOSIS — J32.9 SINUSITIS, UNSPECIFIED CHRONICITY, UNSPECIFIED LOCATION: ICD-10-CM

## 2024-05-07 RX ORDER — MONTELUKAST SODIUM 10 MG/1
10 TABLET ORAL DAILY
Qty: 90 TABLET | Refills: 3 | Status: SHIPPED | OUTPATIENT
Start: 2024-05-07

## 2024-05-07 NOTE — TELEPHONE ENCOUNTER
----- Message from Tera Mireles sent at 5/7/2024  9:52 AM CDT -----  Type:  RX Refill Request    Who Called: pharmacy   Refill or New Rx:refill     RX Name and Strength:montelukast (SINGULAIR) 10 mg tablet    Preferred Pharm[facility] with phone number[id number] [address] AT [facility] [address]

## 2024-05-23 ENCOUNTER — TELEPHONE (OUTPATIENT)
Dept: PRIMARY CARE CLINIC | Facility: CLINIC | Age: 74
End: 2024-05-23
Payer: MEDICARE

## 2024-05-23 DIAGNOSIS — I10 HYPERTENSION, UNSPECIFIED TYPE: ICD-10-CM

## 2024-05-23 RX ORDER — BENAZEPRIL HYDROCHLORIDE 40 MG/1
TABLET ORAL
Qty: 90 TABLET | Refills: 3 | Status: SHIPPED | OUTPATIENT
Start: 2024-05-23

## 2024-05-23 NOTE — TELEPHONE ENCOUNTER
----- Message from Nai De Los Santos sent at 5/23/2024 11:37 AM CDT -----  .Type:  RX Refill Request    Who Called: pt  Refill or New Rx:refill  RX Name and Strength:benazepriL (LOTENSIN) 40 MG tablet  How is the patient currently taking it? (ex. 1XDay):1/day  Is this a 30 day or 90 day RX:30  Preferred Pharmacy with phone number:[facility]  Local or Mail Order:Local  Ordering Provider:Arnoldo  Would the patient rather a call back or a response via [facility]?   Best Call Back Number:[phone]  Additional Information: benazepriL (LOTENSIN) 40 MG tablet

## 2024-06-03 NOTE — PROGRESS NOTES
Date: 06/10/2024  Patient ID: [id number]   Chief Complaint: Follow-up (With labs)    HPI:   Celena Velasco is a 73 y.o. female here today for Follow-up (With labs)  TSH improved and T4 wnl. Overall she denies thyroid sx. Wt has been stable. She had recently recovered URI, and afterwards she started eating more. Plans to continue current meds and maintain lifestyle change.  Otherwise denies acute complaints    Patient Active Problem List   Diagnosis    Morbid (severe) obesity due to excess calories    Hypothyroidism    Hypertension    IGT (impaired glucose tolerance)    Preoperative clearance    Insomnia    MRSA nasal colonization    Exercise-induced asthma    Urinary incontinence, post-void dribbling    HSV (herpes simplex virus) infection    Cherry angioma     Outpatient Medications Marked as Taking for the 6/10/24 encounter (Office Visit) with Nancy Mclaughlin MD   Medication Sig Dispense Refill    benazepriL (LOTENSIN) 40 MG tablet See Instructions, TAKE 1 TABLET BY MOUTH DAILY, # 90 tab(s), 3 Refill(s), Pharmacy: [facility] #[id number], 162, cm, Height/Length Dosing, 09/14/21 8:45:00 CDT, 101.85, kg, Weight Dosing, 09/14/21 8:45:00 CDT 90 tablet 3    biotin 1 mg tablet Take 1,000 mcg by mouth.      buPROPion (WELLBUTRIN XL) 150 MG TB24 tablet Take 1 tablet (150 mg total) by mouth once daily. 30 tablet 11    calcium citrate (CALCITRATE) 200 mg (950 mg) tablet Take 2 tablets by mouth once daily.      diphenhydrAMINE (BENADRYL) 25 mg capsule Take 25 mg by mouth.      hydroCHLOROthiazide (HYDRODIURIL) 50 MG tablet Take 1 tablet (50 mg total) by mouth once daily. 30 tablet 3    ketoconazole (NIZORAL) 2 % cream APPLY TO FACE TWICE DAILY UNTIL CLEAR      montelukast (SINGULAIR) 10 mg tablet Take 1 tablet (10 mg total) by mouth once daily. 90 tablet 3    naltrexone (DEPADE) 50 mg tablet Take 50 mg by mouth once daily. 30 tablet 2    thyroid, pork, (ARMOUR THYROID) 120 mg Tab Take 1 tablet (120 mg total) by  mouth once daily. 90 tablet 3    tiZANidine (ZANAFLEX) 4 MG tablet Take 4 mg by mouth every 8 (eight) hours as needed.      valACYclovir (VALTREX) 500 MG tablet Take 1 tablet (500 mg total) by mouth 2 (two) times daily. 60 tablet 11     Past Medical History:   Diagnosis Date    Angina pectoris, unspecified 07/11/2022    HTN (hypertension)     Hypothyroidism, unspecified     Insomnia     CURTIS (obstructive sleep apnea)     Vitamin D deficiency      Past Surgical History:   Procedure Laterality Date    cataract surgery  2021    [place]    DILATION AND CURETTAGE OF UTERUS      ROBOTIC FUSION,SPINE,LUMBAR,TLIF  10/26/2022    with bone graft    SPINE SURGERY  Oct 26, 2022    Lumbar Surgery    TONSILLECTOMY       Review of patient's allergies indicates:   Allergen Reactions    Adhesive Dermatitis     SKIN PATCHES     Family History   Problem Relation Name Age of Onset    Heart disease Mother Janet Calle     Kidney disease Mother Janet Calle     COPD Father Kang SHAUN Calle Jr.       Social History     Socioeconomic History    Marital status:    Tobacco Use    Smoking status: Never    Smokeless tobacco: Never   Substance and Sexual Activity    Alcohol use: Never    Drug use: Never    Sexual activity: Not Currently     Birth control/protection: Other-see comments     Comment: Menopause     Social Determinants of Health     Financial Resource Strain: Low Risk  (1/1/2024)    Overall Financial Resource Strain (CARDIA)     Difficulty of Paying Living Expenses: Not hard at all   Food Insecurity: No Food Insecurity (1/1/2024)    Hunger Vital Sign     Worried About Running Out of Food in the Last Year: Never true     Ran Out of Food in the Last Year: Never true   Transportation Needs: No Transportation Needs (1/1/2024)    PRAPARE - Transportation     Lack of Transportation (Medical): No     Lack of Transportation (Non-Medical): No   Physical Activity: Inactive (1/1/2024)    Exercise Vital Sign     Days of Exercise per Week:  "0 days     Minutes of Exercise per Session: 0 min   Stress: No Stress Concern Present (1/1/2024)    [employer] [place] [employer] - Occupational Stress Questionnaire     Feeling of Stress : Only a little   Housing Stability: Low Risk  (1/1/2024)    Housing Stability Vital Sign     Unable to Pay for Housing in the Last Year: No     Number of Places Lived in the Last Year: 0     Unstable Housing in the Last Year: No     Patient Care Team:  Nancy Mclaughlin MD as PCP - General (Family Medicine)  Lily Prater MD (Obstetrics and Gynecology)  Samra Hayward MD as Consulting Physician (Dermatology)   Subjective:   ROS  See HPI for details  All Other ROS: Negative except as stated in HPI.   Objective:   /69   Pulse 64   Ht 5' 3" (1.6 m)   Wt 99.9 kg (220 lb 4.8 oz)   SpO2 95%   BMI 39.02 kg/m²   Physical Exam  Constitutional:       Appearance: Normal appearance. She is obese.   Cardiovascular:      Rate and Rhythm: Normal rate and regular rhythm.      Heart sounds: Normal heart sounds.   Pulmonary:      Effort: Pulmonary effort is normal.   Neurological:      Mental Status: She is alert.       Assessment:       ICD-10-CM ICD-9-CM   1. Hypothyroidism, unspecified type  E03.9 244.9   2. Morbid (severe) obesity due to excess calories  E66.01 278.01      Plan:   1. Hypothyroidism, unspecified type  Overview:  Synthroid caused depression  [place] thyroid costly     Assessment & Plan:  Asymptomatic and levels improving  Continue pork thyroid 20mg qd. Obtain TFTs in 2mos  At next refill consider Synthroid since probably less costly (prior to refilling [place] thyroid)    Orders:  -     T4, Free; Future; Expected date: 06/10/2024  -     TSH; Future; Expected date: 06/10/2024    2. Morbid (severe) obesity due to excess calories  Overview:  Ozempic was not covered by insurance  [place]udonne caused pain and aches    Assessment & Plan:  Improving  Continue Naltrexone + Wellbutrin  Continue lifestyle " modifications           Medication List with Changes/Refills   Current Medications    ALBUTEROL (PROAIR HFA) 90 MCG/ACTUATION INHALER    Inhale 2 puffs into the lungs every 6 (six) hours as needed for Wheezing or Shortness of Breath. Rescue       Start Date: 11/21/2023End Date: --    AMITRIPTYLINE (ELAVIL) 25 MG TABLET    Take 1 tablet (25 mg total) by mouth 2 (two) times a day.       Start Date: 11/21/2023End Date: 11/20/2024    BENAZEPRIL (LOTENSIN) 40 MG TABLET    See Instructions, TAKE 1 TABLET BY MOUTH DAILY, # 90 tab(s), 3 Refill(s), Pharmacy: [facility] #[id number], 162, cm, Height/Length Dosing, 09/14/21 8:45:00 CDT, 101.85, kg, Weight Dosing, 09/14/21 8:45:00 CDT       Start Date: 5/23/2024 End Date: --    BIOTIN 1 MG TABLET    Take 1,000 mcg by mouth.       Start Date: --        End Date: --    BUPROPION (WELLBUTRIN XL) 150 MG TB24 TABLET    Take 1 tablet (150 mg total) by mouth once daily.       Start Date: 3/6/2024  End Date: 3/6/2025    CALCIUM CITRATE (CALCITRATE) 200 MG (950 MG) TABLET    Take 2 tablets by mouth once daily.       Start Date: --        End Date: --    DIPHENHYDRAMINE (BENADRYL) 25 MG CAPSULE    Take 25 mg by mouth.       Start Date: --        End Date: --    HYDROCHLOROTHIAZIDE (HYDRODIURIL) 50 MG TABLET    Take 1 tablet (50 mg total) by mouth once daily.       Start Date: 1/4/2024  End Date: --    KETOCONAZOLE (NIZORAL) 2 % CREAM    APPLY TO FACE TWICE DAILY UNTIL CLEAR       Start Date: 2/24/2022 End Date: --    MONTELUKAST (SINGULAIR) 10 MG TABLET    Take 1 tablet (10 mg total) by mouth once daily.       Start Date: 5/7/2024  End Date: --    NALTREXONE (DEPADE) 50 MG TABLET    Take 50 mg by mouth once daily.       Start Date: 3/11/2024 End Date: --    THYROID, PORK, (ARMOUR THYROID) 120 MG TAB    Take 1 tablet (120 mg total) by mouth once daily.       Start Date: 3/1/2024  End Date: --    TIZANIDINE (ZANAFLEX) 4 MG TABLET    Take 4 mg by mouth every 8 (eight) hours as needed.        Start Date: 8/22/2023 End Date: --    TRAZODONE (DESYREL) 50 MG TABLET    TAKE 1 TABLET BY MOUTH EVERY DAY AT BEDTIME AS NEEDED FOR SLEEP       Start Date: 8/15/2022 End Date: --    VALACYCLOVIR (VALTREX) 500 MG TABLET    Take 1 tablet (500 mg total) by mouth 2 (two) times daily.       Start Date: 11/21/2023End Date: --   Discontinued Medications    THYROID, PORK, (ARMOUR THYROID) 15 MG TAB    Take 1 tablet (15 mg total) by mouth before breakfast.       Start Date: 3/11/2024 End Date: 6/10/2024        Follow up in about 2 months (around 8/10/2024) for Telemed, Thyroid, Weight loss, With Labs. In addition to their scheduled follow up, the patient has also been instructed to follow up on as needed basis.

## 2024-06-07 ENCOUNTER — LAB VISIT (OUTPATIENT)
Dept: LAB | Facility: HOSPITAL | Age: 74
End: 2024-06-07
Attending: STUDENT IN AN ORGANIZED HEALTH CARE EDUCATION/TRAINING PROGRAM
Payer: MEDICARE

## 2024-06-07 DIAGNOSIS — E03.9 HYPOTHYROIDISM, UNSPECIFIED TYPE: ICD-10-CM

## 2024-06-07 LAB
T4 FREE SERPL-MCNC: 0.84 NG/DL (ref 0.7–1.48)
TSH SERPL-ACNC: 0.02 UIU/ML (ref 0.35–4.94)

## 2024-06-07 PROCEDURE — 84439 ASSAY OF FREE THYROXINE: CPT

## 2024-06-07 PROCEDURE — 84443 ASSAY THYROID STIM HORMONE: CPT

## 2024-06-07 PROCEDURE — 36415 COLL VENOUS BLD VENIPUNCTURE: CPT

## 2024-06-10 ENCOUNTER — OFFICE VISIT (OUTPATIENT)
Dept: PRIMARY CARE CLINIC | Facility: CLINIC | Age: 74
End: 2024-06-10
Payer: MEDICARE

## 2024-06-10 VITALS
BODY MASS INDEX: 39.04 KG/M2 | DIASTOLIC BLOOD PRESSURE: 69 MMHG | WEIGHT: 220.31 LBS | HEIGHT: 63 IN | HEART RATE: 64 BPM | SYSTOLIC BLOOD PRESSURE: 125 MMHG | OXYGEN SATURATION: 95 %

## 2024-06-10 DIAGNOSIS — E03.9 HYPOTHYROIDISM, UNSPECIFIED TYPE: Primary | ICD-10-CM

## 2024-06-10 DIAGNOSIS — E66.01 MORBID (SEVERE) OBESITY DUE TO EXCESS CALORIES: ICD-10-CM

## 2024-06-10 PROCEDURE — 99214 OFFICE O/P EST MOD 30 MIN: CPT | Mod: ,,, | Performed by: STUDENT IN AN ORGANIZED HEALTH CARE EDUCATION/TRAINING PROGRAM

## 2024-06-10 NOTE — ASSESSMENT & PLAN NOTE
Asymptomatic and levels improving  Continue pork thyroid 20mg qd. Obtain TFTs in 2mos  At next refill consider Synthroid since probably less costly (prior to refilling [place] thyroid)

## 2024-06-18 DIAGNOSIS — I10 PRIMARY HYPERTENSION: ICD-10-CM

## 2024-06-18 RX ORDER — HYDROCHLOROTHIAZIDE 50 MG/1
50 TABLET ORAL DAILY
Qty: 30 TABLET | Refills: 3 | Status: SHIPPED | OUTPATIENT
Start: 2024-06-18

## 2024-07-15 ENCOUNTER — DOCUMENTATION ONLY (OUTPATIENT)
Dept: PRIMARY CARE CLINIC | Facility: CLINIC | Age: 74
End: 2024-07-15
Payer: MEDICARE

## 2024-07-15 LAB — BCS RECOMMENDATION EXT: NORMAL

## 2024-10-28 NOTE — PROGRESS NOTES
Date: 11/04/2024  Patient ID: [id number]   Chief Complaint: Back Pain (Lower back pain, l4-5, lifted up on bottled water, stabbing pain, no radiating)    HPI:   Celena Velasco is a 74 y.o. female here today for Back Pain (Lower back pain, l4-5, lifted up on bottled water, stabbing pain, no radiating)  She has had 6wks of low back pain that worsened after lifting water pack 2wks ago. Pain is constant severe low back, constant, improved to 5/10 with NSAIDs and muscle relaxer and Luca, worsens with getting up to walk. Denies saddle anesthesia or stool/urinary incontinence.     Patient Active Problem List   Diagnosis    Morbid (severe) obesity due to excess calories    Hypothyroidism    Hypertension    IGT (impaired glucose tolerance)    Preoperative clearance    Insomnia    MRSA nasal colonization    Exercise-induced asthma    Urinary incontinence, post-void dribbling    HSV (herpes simplex virus) infection    Cherry angioma    Lumbosacral strain     Outpatient Medications Marked as Taking for the 11/4/24 encounter (Office Visit) with Nancy Mclaughlin MD   Medication Sig Dispense Refill    benazepriL (LOTENSIN) 40 MG tablet See Instructions, TAKE 1 TABLET BY MOUTH DAILY, # 90 tab(s), 3 Refill(s), Pharmacy: [facility] #[id number], 162, cm, Height/Length Dosing, 09/14/21 8:45:00 CDT, 101.85, kg, Weight Dosing, 09/14/21 8:45:00 CDT 90 tablet 3    biotin 1 mg tablet Take 1,000 mcg by mouth.      buPROPion (WELLBUTRIN XL) 150 MG TB24 tablet Take 1 tablet (150 mg total) by mouth once daily. 30 tablet 11    calcium citrate (CALCITRATE) 200 mg (950 mg) tablet Take 2 tablets by mouth once daily.      diphenhydrAMINE (BENADRYL) 25 mg capsule Take 25 mg by mouth.      hydroCHLOROthiazide (HYDRODIURIL) 50 MG tablet Take 1 tablet (50 mg total) by mouth once daily. 30 tablet 3    ketoconazole (NIZORAL) 2 % cream APPLY TO FACE TWICE DAILY UNTIL CLEAR      montelukast (SINGULAIR) 10 mg tablet Take 1 tablet (10 mg total) by  mouth once daily. 90 tablet 3    naltrexone (DEPADE) 50 mg tablet Take 50 mg by mouth once daily. 30 tablet 2    thyroid, pork, (ARMOUR THYROID) 120 mg Tab Take 1 tablet (120 mg total) by mouth once daily. 90 tablet 3    valACYclovir (VALTREX) 500 MG tablet Take 1 tablet (500 mg total) by mouth 2 (two) times daily. 60 tablet 11    [DISCONTINUED] tiZANidine (ZANAFLEX) 4 MG tablet Take 4 mg by mouth every 8 (eight) hours as needed.       Past Medical History:   Diagnosis Date    Angina pectoris, unspecified 07/11/2022    HTN (hypertension)     Hypothyroidism, unspecified     Insomnia     CURTIS (obstructive sleep apnea)     Vitamin D deficiency      Past Surgical History:   Procedure Laterality Date    cataract surgery  2021    [place]    DILATION AND CURETTAGE OF UTERUS      ROBOTIC FUSION,SPINE,LUMBAR,TLIF  10/26/2022    with bone graft    SPINE SURGERY  Oct 26, 2022    Lumbar Surgery    TONSILLECTOMY       Review of patient's allergies indicates:   Allergen Reactions    Adhesive Dermatitis     SKIN PATCHES     Family History   Problem Relation Name Age of Onset    Heart disease Mother Janet Luc     Kidney disease Mother Janet Luc     COPD Father Kang Calle Jr.       Social History     Socioeconomic History    Marital status:    Tobacco Use    Smoking status: Never    Smokeless tobacco: Never   Substance and Sexual Activity    Alcohol use: Never    Drug use: Never    Sexual activity: Not Currently     Birth control/protection: Other-see comments     Comment: Menopause     Social Drivers of Health     Financial Resource Strain: Low Risk  (1/1/2024)    Overall Financial Resource Strain (CARDIA)     Difficulty of Paying Living Expenses: Not hard at all   Food Insecurity: No Food Insecurity (1/1/2024)    Hunger Vital Sign     Worried About Running Out of Food in the Last Year: Never true     Ran Out of Food in the Last Year: Never true   Transportation Needs: No Transportation Needs (1/1/2024)    GENNY  "- Transportation     Lack of Transportation (Medical): No     Lack of Transportation (Non-Medical): No   Physical Activity: Inactive (1/1/2024)    Exercise Vital Sign     Days of Exercise per Week: 0 days     Minutes of Exercise per Session: 0 min   Stress: No Stress Concern Present (1/1/2024)    [employer] [place] [employer] - Occupational Stress Questionnaire     Feeling of Stress : Only a little   Housing Stability: Low Risk  (1/1/2024)    Housing Stability Vital Sign     Unable to Pay for Housing in the Last Year: No     Number of Places Lived in the Last Year: 0     Unstable Housing in the Last Year: No     Patient Care Team:  Nancy Mclaughlin MD as PCP - General (Family Medicine)  Lily Prater MD (Obstetrics and Gynecology)  Samra Hayward MD as Consulting Physician (Dermatology)   Subjective:   ROS  See HPI for details  All Other ROS: Negative except as stated in HPI.   Objective:   /69   Pulse (!) 57   Ht 5' 3" (1.6 m)   Wt 101.8 kg (224 lb 8 oz)   SpO2 97%   BMI 39.77 kg/m²   Physical Exam  Constitutional:       General: She is not in acute distress.     Appearance: She is obese.   Musculoskeletal:      Comments: Lateral rotation wnl but flexion causes tenderness to LS spine, which has post-surgical scars and is NTTP    Neurological:      Mental Status: She is alert.       Assessment:       ICD-10-CM ICD-9-CM   1. Lumbosacral strain, subsequent encounter  S39.012D V58.89     846.0      Plan:   1. Lumbosacral strain, subsequent encounter  Assessment & Plan:  Continue Tizanidine 4mg q8h prn and NSAIDs  Consider Mobic if not improve  Rest, local heat or ice to the area, OTC NSAIDs (Diclofenac Gel applied to affected area bid prn, Lidocaine 4% patch on for 12 of every 24 hours prn)  Avoid provocative activities. Activity as tolerated  Home exercise program  Weight loss as indicated  Consider using supportive brace  Consider acupuncture, TENS unit, massage " therapy  Consider Physical Therapy  If symptoms worsen, consider imaging, referral to sports med/ortho for further evaluation/intervention    Orders:  -     tiZANidine (ZANAFLEX) 4 MG tablet; Take 1 tablet (4 mg total) by mouth every 8 (eight) hours as needed.  Dispense: 30 tablet; Refill: 11         Medication List with Changes/Refills   Current Medications    ALBUTEROL (PROAIR HFA) 90 MCG/ACTUATION INHALER    Inhale 2 puffs into the lungs every 6 (six) hours as needed for Wheezing or Shortness of Breath. Rescue       Start Date: 11/21/2023End Date: --    AMITRIPTYLINE (ELAVIL) 25 MG TABLET    Take 1 tablet (25 mg total) by mouth 2 (two) times a day.       Start Date: 11/21/2023End Date: 11/20/2024    BENAZEPRIL (LOTENSIN) 40 MG TABLET    See Instructions, TAKE 1 TABLET BY MOUTH DAILY, # 90 tab(s), 3 Refill(s), Pharmacy: [facility] #[id number], 162, cm, Height/Length Dosing, 09/14/21 8:45:00 CDT, 101.85, kg, Weight Dosing, 09/14/21 8:45:00 CDT       Start Date: 5/23/2024 End Date: --    BIOTIN 1 MG TABLET    Take 1,000 mcg by mouth.       Start Date: --        End Date: --    BUPROPION (WELLBUTRIN XL) 150 MG TB24 TABLET    Take 1 tablet (150 mg total) by mouth once daily.       Start Date: 3/6/2024  End Date: 3/6/2025    CALCIUM CITRATE (CALCITRATE) 200 MG (950 MG) TABLET    Take 2 tablets by mouth once daily.       Start Date: --        End Date: --    DIPHENHYDRAMINE (BENADRYL) 25 MG CAPSULE    Take 25 mg by mouth.       Start Date: --        End Date: --    HYDROCHLOROTHIAZIDE (HYDRODIURIL) 50 MG TABLET    Take 1 tablet (50 mg total) by mouth once daily.       Start Date: 6/18/2024 End Date: --    KETOCONAZOLE (NIZORAL) 2 % CREAM    APPLY TO FACE TWICE DAILY UNTIL CLEAR       Start Date: 2/24/2022 End Date: --    MONTELUKAST (SINGULAIR) 10 MG TABLET    Take 1 tablet (10 mg total) by mouth once daily.       Start Date: 5/7/2024  End Date: --    NALTREXONE (DEPADE) 50 MG TABLET    Take 50 mg by mouth once  daily.       Start Date: 3/11/2024 End Date: --    THYROID, PORK, (ARMOUR THYROID) 120 MG TAB    Take 1 tablet (120 mg total) by mouth once daily.       Start Date: 3/1/2024  End Date: --    TRAZODONE (DESYREL) 50 MG TABLET    TAKE 1 TABLET BY MOUTH EVERY DAY AT BEDTIME AS NEEDED FOR SLEEP       Start Date: 8/15/2022 End Date: --    VALACYCLOVIR (VALTREX) 500 MG TABLET    Take 1 tablet (500 mg total) by mouth 2 (two) times daily.       Start Date: 11/21/2023End Date: --   Changed and/or Refilled Medications    Modified Medication Previous Medication    TIZANIDINE (ZANAFLEX) 4 MG TABLET tiZANidine (ZANAFLEX) 4 MG tablet       Take 1 tablet (4 mg total) by mouth every 8 (eight) hours as needed.    Take 4 mg by mouth every 8 (eight) hours as needed.       Start Date: 11/4/2024 End Date: --    Start Date: 8/22/2023 End Date: 11/4/2024        Follow up in about 3 weeks (around 11/25/2024) for low back pain. In addition to their scheduled follow up, the patient has also been instructed to follow up on as needed basis.

## 2024-11-04 ENCOUNTER — OFFICE VISIT (OUTPATIENT)
Dept: PRIMARY CARE CLINIC | Facility: CLINIC | Age: 74
End: 2024-11-04
Payer: MEDICARE

## 2024-11-04 VITALS
WEIGHT: 224.5 LBS | DIASTOLIC BLOOD PRESSURE: 69 MMHG | OXYGEN SATURATION: 97 % | BODY MASS INDEX: 39.78 KG/M2 | SYSTOLIC BLOOD PRESSURE: 116 MMHG | HEIGHT: 63 IN | HEART RATE: 57 BPM

## 2024-11-04 DIAGNOSIS — S39.012D LUMBOSACRAL STRAIN, SUBSEQUENT ENCOUNTER: Primary | ICD-10-CM

## 2024-11-04 PROBLEM — S39.012A LUMBOSACRAL STRAIN: Status: ACTIVE | Noted: 2024-11-04

## 2024-11-04 PROCEDURE — 99214 OFFICE O/P EST MOD 30 MIN: CPT | Mod: ,,, | Performed by: STUDENT IN AN ORGANIZED HEALTH CARE EDUCATION/TRAINING PROGRAM

## 2024-11-04 RX ORDER — TIZANIDINE 4 MG/1
4 TABLET ORAL EVERY 8 HOURS PRN
Qty: 30 TABLET | Refills: 11 | Status: SHIPPED | OUTPATIENT
Start: 2024-11-04

## 2024-11-04 NOTE — ASSESSMENT & PLAN NOTE
Continue Tizanidine 4mg q8h prn and NSAIDs  Consider Mobic if not improve  Rest, local heat or ice to the area, OTC NSAIDs (Diclofenac Gel applied to affected area bid prn, Lidocaine 4% patch on for 12 of every 24 hours prn)  Avoid provocative activities. Activity as tolerated  Home exercise program  Weight loss as indicated  Consider using supportive brace  Consider acupuncture, TENS unit, massage therapy  Consider Physical Therapy  If symptoms worsen, consider imaging, referral to sports med/ortho for further evaluation/intervention

## 2024-11-20 NOTE — PROGRESS NOTES
Date: 11/26/2024  Patient ID: [id number]   Chief Complaint: Back Pain (Follow up on back pain, states a little stiff, able to take medication with relief)    HPI:   Celena Velasco is a 74 y.o. female here today for Back Pain (Follow up on back pain, states a little stiff, able to take medication with relief)  Back pain overall has improved, back just is stiff from lifting groceries yesterday. Tizanidine helps. She takes NSAID in am and Tylenol ES with caffeine in afternoon, which help decrease pain throughout the day.     Patient Active Problem List   Diagnosis    Morbid (severe) obesity due to excess calories    Hypothyroidism    Hypertension    IGT (impaired glucose tolerance)    Preoperative clearance    Insomnia    MRSA nasal colonization    Exercise-induced asthma    Urinary incontinence, post-void dribbling    HSV (herpes simplex virus) infection    Cherry angioma    Lumbosacral strain     Outpatient Medications Marked as Taking for the 11/26/24 encounter (Office Visit) with Nacny Mclaughlin MD   Medication Sig Dispense Refill    benazepriL (LOTENSIN) 40 MG tablet See Instructions, TAKE 1 TABLET BY MOUTH DAILY, # 90 tab(s), 3 Refill(s), Pharmacy: [id number], 162, cm, Height/Length Dosing, 09/14/21 8:45:00 CDT, 101.85, kg, Weight Dosing, 09/14/21 8:45:00 CDT 90 tablet 3    biotin 1 mg tablet Take 1,000 mcg by mouth.      buPROPion (WELLBUTRIN XL) 150 MG TB24 tablet Take 1 tablet (150 mg total) by mouth once daily. 30 tablet 11    calcium citrate (CALCITRATE) 200 mg (950 mg) tablet Take 2 tablets by mouth once daily.      diphenhydrAMINE (BENADRYL) 25 mg capsule Take 25 mg by mouth.      hydroCHLOROthiazide (HYDRODIURIL) 50 MG tablet Take 1 tablet (50 mg total) by mouth once daily. 30 tablet 3    ketoconazole (NIZORAL) 2 % cream APPLY TO FACE TWICE DAILY UNTIL CLEAR      montelukast (SINGULAIR) 10 mg tablet Take 1 tablet (10 mg total) by mouth once daily. 90 tablet 3    naltrexone (DEPADE)  50 mg tablet Take 50 mg by mouth once daily. 30 tablet 2    thyroid, pork, (ARMOUR THYROID) 120 mg Tab Take 1 tablet (120 mg total) by mouth once daily. 90 tablet 3    tiZANidine (ZANAFLEX) 4 MG tablet Take 1 tablet (4 mg total) by mouth every 8 (eight) hours as needed. 30 tablet 11    valACYclovir (VALTREX) 500 MG tablet Take 1 tablet (500 mg total) by mouth 2 (two) times daily. 60 tablet 11     Past Medical History:   Diagnosis Date    Angina pectoris, unspecified 07/11/2022    HTN (hypertension)     Hypothyroidism, unspecified     Insomnia     CURTIS (obstructive sleep apnea)     Vitamin D deficiency      Past Surgical History:   Procedure Laterality Date    cataract surgery  2021    [place]    DILATION AND CURETTAGE OF UTERUS      ROBOTIC FUSION,SPINE,LUMBAR,TLIF  10/26/2022    with bone graft    SPINE SURGERY  Oct 26, 2022    Lumbar Surgery    TONSILLECTOMY       Review of patient's allergies indicates:   Allergen Reactions    Adhesive Dermatitis     SKIN PATCHES     Family History   Problem Relation Name Age of Onset    Heart disease Mother Janet Calle     Kidney disease Mother Janet Luc     COPD Father Kang Calle Jr.       Social History     Socioeconomic History    Marital status:    Tobacco Use    Smoking status: Never    Smokeless tobacco: Never   Substance and Sexual Activity    Alcohol use: Never    Drug use: Never    Sexual activity: Not Currently     Birth control/protection: Other-see comments     Comment: Menopause     Social Drivers of Health     Financial Resource Strain: Low Risk  (1/1/2024)    Overall Financial Resource Strain (CARDIA)     Difficulty of Paying Living Expenses: Not hard at all   Food Insecurity: No Food Insecurity (1/1/2024)    Hunger Vital Sign     Worried About Running Out of Food in the Last Year: Never true     Ran Out of Food in the Last Year: Never true   Transportation Needs: No Transportation Needs (1/1/2024)    PRAPARE - Transportation     Lack of  "Transportation (Medical): No     Lack of Transportation (Non-Medical): No   Physical Activity: Inactive (1/1/2024)    Exercise Vital Sign     Days of Exercise per Week: 0 days     Minutes of Exercise per Session: 0 min   Stress: No Stress Concern Present (1/1/2024)    [employer] [place] [employer] - Occupational Stress Questionnaire     Feeling of Stress : Only a little   Housing Stability: Low Risk  (1/1/2024)    Housing Stability Vital Sign     Unable to Pay for Housing in the Last Year: No     Number of Places Lived in the Last Year: 0     Unstable Housing in the Last Year: No     Patient Care Team:  Nancy Mclaughlin MD as PCP - General (Family Medicine)  Lily Prater MD (Obstetrics and Gynecology)  Samra Hayward MD as Consulting Physician (Dermatology)   Subjective:   ROS  See HPI for details  All Other ROS: Negative except as stated in HPI.   Objective:   /72   Pulse 73   Ht 5' 3" (1.6 m)   Wt 103 kg (227 lb 1.6 oz)   SpO2 (!) 94%   BMI 40.23 kg/m²   Physical Exam  General: NAD  Eye: EOMI  HENT: no nasal discharge  Respiratory: non-labored breathing  Musculoskeletal: ambulates independently. No obvious deformity  Integumentary: no apparent discoloration  Neurologic: Alert, oriented to person and situation  Cognition and Speech: Speech clear and coherent.   Psychiatric: Cooperative    Assessment:       ICD-10-CM ICD-9-CM   1. Lumbosacral strain, subsequent encounter  S39.012D V58.89     846.0   2. Hypothyroidism, unspecified type  E03.9 244.9   3. Morbid (severe) obesity due to excess calories  E66.01 278.01   4. IGT (impaired glucose tolerance)  R73.02 790.22   5. Primary hypertension  I10 401.9      Plan:   1. Lumbosacral strain, subsequent encounter  Assessment & Plan:  Improved  Continue Tizanidine 4mg q8h prn and NSAIDs  Rest, local heat or ice to the area, OTC NSAIDs (Diclofenac Gel applied to affected area bid prn, Lidocaine 4% patch on for 12 of every 24 hours " prn)  Avoid provocative activities. Activity as tolerated  Home exercise program  Weight loss as indicated  Consider using supportive brace  Consider acupuncture, TENS unit, massage therapy  Consider Physical Therapy      2. Hypothyroidism, unspecified type  Overview:  Synthroid caused depression  [place] thyroid costly     Orders:  -     TSH; Future; Expected date: 02/26/2025  -     T4, Free; Future; Expected date: 02/26/2025    3. Morbid (severe) obesity due to excess calories  Overview:  Ozempic was not covered by insurance  [place] caused pain and aches    Orders:  -     Comprehensive Metabolic Panel; Future; Expected date: 02/26/2025  -     Lipid Panel; Future; Expected date: 02/26/2025  -     TSH; Future; Expected date: 02/26/2025  -     Hemoglobin A1C; Future; Expected date: 02/26/2025  -     T4, Free; Future; Expected date: 02/26/2025    4. IGT (impaired glucose tolerance)  -     Hemoglobin A1C; Future; Expected date: 02/26/2025    5. Primary hypertension  Overview:  CCB caused edema    Orders:  -     Comprehensive Metabolic Panel; Future; Expected date: 02/26/2025         Medication List with Changes/Refills   Current Medications    ALBUTEROL (PROAIR HFA) 90 MCG/ACTUATION INHALER    Inhale 2 puffs into the lungs every 6 (six) hours as needed for Wheezing or Shortness of Breath. Rescue       Start Date: 11/21/2023End Date: --    AMITRIPTYLINE (ELAVIL) 25 MG TABLET    Take 1 tablet (25 mg total) by mouth 2 (two) times a day.       Start Date: 11/21/2023End Date: 11/20/2024    BENAZEPRIL (LOTENSIN) 40 MG TABLET    See Instructions, TAKE 1 TABLET BY MOUTH DAILY, # 90 tab(s), 3 Refill(s), Pharmacy: [facility] #[id number], 162, cm, Height/Length Dosing, 09/14/21 8:45:00 CDT, 101.85, kg, Weight Dosing, 09/14/21 8:45:00 CDT       Start Date: 5/23/2024 End Date: --    BIOTIN 1 MG TABLET    Take 1,000 mcg by mouth.       Start Date: --        End Date: --    BUPROPION (WELLBUTRIN XL) 150 MG TB24 TABLET    Take 1  tablet (150 mg total) by mouth once daily.       Start Date: 3/6/2024  End Date: 3/6/2025    CALCIUM CITRATE (CALCITRATE) 200 MG (950 MG) TABLET    Take 2 tablets by mouth once daily.       Start Date: --        End Date: --    DIPHENHYDRAMINE (BENADRYL) 25 MG CAPSULE    Take 25 mg by mouth.       Start Date: --        End Date: --    HYDROCHLOROTHIAZIDE (HYDRODIURIL) 50 MG TABLET    Take 1 tablet (50 mg total) by mouth once daily.       Start Date: 6/18/2024 End Date: --    KETOCONAZOLE (NIZORAL) 2 % CREAM    APPLY TO FACE TWICE DAILY UNTIL CLEAR       Start Date: 2/24/2022 End Date: --    MONTELUKAST (SINGULAIR) 10 MG TABLET    Take 1 tablet (10 mg total) by mouth once daily.       Start Date: 5/7/2024  End Date: --    NALTREXONE (DEPADE) 50 MG TABLET    Take 50 mg by mouth once daily.       Start Date: 3/11/2024 End Date: --    THYROID, PORK, (ARMOUR THYROID) 120 MG TAB    Take 1 tablet (120 mg total) by mouth once daily.       Start Date: 3/1/2024  End Date: --    TIZANIDINE (ZANAFLEX) 4 MG TABLET    Take 1 tablet (4 mg total) by mouth every 8 (eight) hours as needed.       Start Date: 11/4/2024 End Date: --    TRAZODONE (DESYREL) 50 MG TABLET    TAKE 1 TABLET BY MOUTH EVERY DAY AT BEDTIME AS NEEDED FOR SLEEP       Start Date: 8/15/2022 End Date: --    VALACYCLOVIR (VALTREX) 500 MG TABLET    Take 1 tablet (500 mg total) by mouth 2 (two) times daily.       Start Date: 11/21/2023End Date: --        Follow up in about 5 months (around 4/26/2025) for Medicare Wellness. In addition to their scheduled follow up, the patient has also been instructed to follow up on as needed basis.

## 2024-11-26 ENCOUNTER — OFFICE VISIT (OUTPATIENT)
Dept: PRIMARY CARE CLINIC | Facility: CLINIC | Age: 74
End: 2024-11-26
Payer: MEDICARE

## 2024-11-26 VITALS
DIASTOLIC BLOOD PRESSURE: 72 MMHG | OXYGEN SATURATION: 94 % | HEART RATE: 73 BPM | SYSTOLIC BLOOD PRESSURE: 120 MMHG | HEIGHT: 63 IN | BODY MASS INDEX: 40.24 KG/M2 | WEIGHT: 227.13 LBS

## 2024-11-26 DIAGNOSIS — E66.01 MORBID (SEVERE) OBESITY DUE TO EXCESS CALORIES: ICD-10-CM

## 2024-11-26 DIAGNOSIS — I10 PRIMARY HYPERTENSION: ICD-10-CM

## 2024-11-26 DIAGNOSIS — S39.012D LUMBOSACRAL STRAIN, SUBSEQUENT ENCOUNTER: Primary | ICD-10-CM

## 2024-11-26 DIAGNOSIS — R73.02 IGT (IMPAIRED GLUCOSE TOLERANCE): ICD-10-CM

## 2024-11-26 DIAGNOSIS — E03.9 HYPOTHYROIDISM, UNSPECIFIED TYPE: ICD-10-CM

## 2024-11-26 PROCEDURE — 99213 OFFICE O/P EST LOW 20 MIN: CPT | Mod: ,,, | Performed by: STUDENT IN AN ORGANIZED HEALTH CARE EDUCATION/TRAINING PROGRAM

## 2024-11-26 NOTE — ASSESSMENT & PLAN NOTE
Improved  Continue Tizanidine 4mg q8h prn and NSAIDs  Rest, local heat or ice to the area, OTC NSAIDs (Diclofenac Gel applied to affected area bid prn, Lidocaine 4% patch on for 12 of every 24 hours prn)  Avoid provocative activities. Activity as tolerated  Home exercise program  Weight loss as indicated  Consider using supportive brace  Consider acupuncture, TENS unit, massage therapy  Consider Physical Therapy

## 2025-01-08 ENCOUNTER — TELEPHONE (OUTPATIENT)
Dept: PRIMARY CARE CLINIC | Facility: CLINIC | Age: 75
End: 2025-01-08
Payer: MEDICARE

## 2025-01-08 DIAGNOSIS — I10 PRIMARY HYPERTENSION: ICD-10-CM

## 2025-01-08 RX ORDER — HYDROCHLOROTHIAZIDE 50 MG/1
50 TABLET ORAL DAILY
Qty: 90 TABLET | Refills: 0 | Status: SHIPPED | OUTPATIENT
Start: 2025-01-08

## 2025-01-08 NOTE — TELEPHONE ENCOUNTER
----- Message from Gregorio sent at 1/7/2025  1:03 PM CST -----  .Who Called: Celena Velasco    Refill or New Rx:Refill  RX Name and Strength:hydroCHLOROthiazide (HYDRODIURIL) 50 MG tablet  How is the patient currently taking it? (ex. 1XDay):Sig - Route: Take 1 tablet (50 mg total) by mouth once daily. - Oral  Is this a 30 day or 90 day RX:30 day  Local or Mail Order: local  List of preferred pharmacies on file (remove unneeded): [email or web address] #[id number] - [place], [place] - [address] AT [facility] [address]  Ordering Provider:      Preferred Method of Contact: Phone Call  Patient's Preferred Phone Number on File: [phone]   Best Call Back Number, if different:  Additional Information:  
Refill sent  
No

## 2025-03-19 DIAGNOSIS — E03.9 HYPOTHYROIDISM, UNSPECIFIED TYPE: ICD-10-CM

## 2025-03-19 RX ORDER — THYROID 120 MG/1
120 TABLET ORAL DAILY
Qty: 90 TABLET | Refills: 0 | Status: SHIPPED | OUTPATIENT
Start: 2025-03-19

## 2025-04-09 ENCOUNTER — TELEPHONE (OUTPATIENT)
Dept: PRIMARY CARE CLINIC | Facility: CLINIC | Age: 75
End: 2025-04-09
Payer: MEDICARE

## 2025-04-09 DIAGNOSIS — I10 PRIMARY HYPERTENSION: ICD-10-CM

## 2025-04-09 RX ORDER — HYDROCHLOROTHIAZIDE 50 MG/1
50 TABLET ORAL DAILY
Qty: 90 TABLET | Refills: 3 | Status: SHIPPED | OUTPATIENT
Start: 2025-04-09

## 2025-04-09 NOTE — TELEPHONE ENCOUNTER
----- Message from Anthony sent at 4/9/2025 10:37 AM CDT -----  .Who [facility] Rx: RefillRX Name and Strength: hydroCHLOROthiazide (HYDRODIURIL) 50 MG tabletHow is the patient currently taking it? (ex. 1XDay): Sig - Route: Take 1 tablet (50 mg total) by mouth once daily. - OralIs this a 30 day or 90 day RX: 90Local or Mail Order: LocalList of preferred pharmacies on file (remove unneeded): [facility] #[id number] - [place], [place] - [address] AT [facility] [address] Phone: [phone]: [phone] Provider: Dr. Menchaca Method of Contact: Phone CallPatient's Preferred Phone Number on File: [phone] Best Call Back Number, if different:Additional Information: Called to approve refill request for above medication. Attempted back line, no response. Please advise, thank you.

## 2025-05-19 ENCOUNTER — TELEPHONE (OUTPATIENT)
Dept: PRIMARY CARE CLINIC | Facility: CLINIC | Age: 75
End: 2025-05-19
Payer: MEDICARE

## 2025-05-19 NOTE — TELEPHONE ENCOUNTER
Copied from CRM #[id number]. Topic: General Inquiry - Patient Advice  >> May 19, 2025  1:03 PM Nani wrote:  Who Called: Celena Velasco    Caller is requesting assistance/information from provider's office.    Symptoms (please be specific):    How long has patient had these symptoms:    List of preferred pharmacies on file (remove unneeded): [unfilled]  If different, enter pharmacy into here including location and phone number:       Preferred Method of Contact: Phone Call  Patient's Preferred Phone Number on File: [phone]   Best Call Back Number, if different:  Additional Information: Pt called to speak with the nurse about a ingrown toenail.

## 2025-05-22 ENCOUNTER — TELEPHONE (OUTPATIENT)
Dept: PRIMARY CARE CLINIC | Facility: CLINIC | Age: 75
End: 2025-05-22
Payer: MEDICARE

## 2025-05-22 NOTE — TELEPHONE ENCOUNTER
Copied from CRM #[id number]. Topic: General Inquiry - Return Call  >> May 22, 2025  1:21 PM Ashly wrote:  Who Called: Celena Velasco    Patient is returning phone call    Who Left Message for Patient:hope  Does the patient know what this is regarding?: ingrown toe nail      Preferred Method of Contact: Phone Call  Patient's Preferred Phone Number on File: [phone]   Best Call Back Number, if different:  Additional Information: return call, please advise, thanks

## 2025-06-05 PROBLEM — Z22.322 MRSA NASAL COLONIZATION: Status: RESOLVED | Noted: 2022-09-13 | Resolved: 2025-06-05

## 2025-06-05 PROBLEM — Z01.818 PREOPERATIVE CLEARANCE: Status: RESOLVED | Noted: 2022-09-13 | Resolved: 2025-06-05

## 2025-06-05 NOTE — ASSESSMENT & PLAN NOTE
Provided Celena Velasco with a 5-10 year written screening schedule and personal prevention plan. Recommendations were developed using the USPSTF age appropriate recommendations. Education, counseling, and referrals were provided as needed. After Visit Summary printed and given to patient which includes a list of additional screenings\tests needed.

## 2025-06-05 NOTE — PROGRESS NOTES
Date: 06/10/2025  Patient ID: [id number]   Chief Complaint: Medicare AWV (Without labs, had covid/flu 16 days ago)    HPI:   Celena Velasco is a 74 y.o. female here today for an Annual Wellness Visit.     Opioid Screening: Patient medication list reviewed, patient is not taking prescription opioids. Patient is not using additional opioids than prescribed. Patient is at low risk of substance abuse based on this opioid use history.     She reports recent concurrent COVID-19 and influenza infection. Symptoms began with mild cough, progressing to fever by 2 AM and severe fatigue that confined her to bed on Sunday. She tested positive for both viruses at a walk-in clinic. Currently on day 16 of illness with residual cough and post-nasal drip. She experienced decreased appetite during the acute phase but denies current fever. She reports an 11-pound weight loss since November. Current diet consists primarily of protein, sometimes limited to chicken without other food items. She has hypertension managed with benazepril and hydrochlorothiazide. She also has a history of thyroid disorder managed with [place] Thyroid 120 mg. Current medications include benazepril, hydrochlorothiazide, montelukast, [place] Thyroid 120 mg, Zanaflex for muscle spasms, and Valtrex as needed. She has ketoconazole cream available for fungal infection flare-ups but is not currently using it. She follows with Dr. Koffi Prater annually for GYN care. Recent Cologuard test was positive.      ROS:  General: -fever, +loss of appetite  ENT: +post nasal drip  Cardiovascular: -chest pain  Respiratory: +cough, -shortness of breath  Gastrointestinal: -blood in stool  Allergic: +allergic reactions           Diet: protein, chicken, veggies  Activity level: plans to do more activity  Anxiety: denies  Depression: denies  Cognition: answering questions appropriately and following conversation without issues. No sign of cognitive decline during this exam  MMG:  with OBGYN  Pap: NA  DEXA: 4/2018.   CRC: 4/2025 cologuard positive    Problem List[1]  Outpatient Medications Marked as Taking for the 6/10/25 encounter (Office Visit) with Nancy Mclaughlin MD   Medication Sig Dispense Refill    biotin 1 mg tablet Take 1,000 mcg by mouth.      calcium citrate (CALCITRATE) 200 mg (950 mg) tablet Take 2 tablets by mouth once daily.      ketoconazole (NIZORAL) 2 % cream APPLY TO FACE TWICE DAILY UNTIL CLEAR      thyroid, pork, (ARMOUR THYROID) 120 mg Tab Take 1 tablet (120 mg total) by mouth once daily. 90 tablet 0    tiZANidine (ZANAFLEX) 4 MG tablet Take 1 tablet (4 mg total) by mouth every 8 (eight) hours as needed. 30 tablet 11    [DISCONTINUED] benazepriL (LOTENSIN) 40 MG tablet See Instructions, TAKE 1 TABLET BY MOUTH DAILY, # 90 tab(s), 3 Refill(s), Pharmacy: [id number], 162, cm, Height/Length Dosing, 09/14/21 8:45:00 CDT, 101.85, kg, Weight Dosing, 09/14/21 8:45:00 CDT 90 tablet 3    [DISCONTINUED] hydroCHLOROthiazide (HYDRODIURIL) 50 MG tablet Take 1 tablet (50 mg total) by mouth once daily. 90 tablet 3    [DISCONTINUED] montelukast (SINGULAIR) 10 mg tablet Take 1 tablet (10 mg total) by mouth once daily. 90 tablet 3    [DISCONTINUED] valACYclovir (VALTREX) 500 MG tablet Take 1 tablet (500 mg total) by mouth 2 (two) times daily. 60 tablet 11     Past Medical History:   Diagnosis Date    Angina pectoris, unspecified 07/11/2022    COVID 05/2025    HTN (hypertension)     Hypothyroidism, unspecified     Influenza 05/2025    Insomnia     Lumbosacral strain 11/04/2024    MRSA nasal colonization 09/13/2022    Complete rx'd course of nasal mupirocin for 5 days.       CURTIS (obstructive sleep apnea)     Urinary incontinence, post-void dribbling 11/21/2023    Vitamin D deficiency      Past Surgical History:   Procedure Laterality Date    cataract surgery  2021    [place]    DILATION AND CURETTAGE OF UTERUS      ROBOTIC FUSION,SPINE,LUMBAR,TLIF  10/26/2022    with bone  "graft    SPINE SURGERY  Oct 26, 2022    Lumbar Surgery    TONSILLECTOMY       Review of patient's allergies indicates:   Allergen Reactions    Adhesive Dermatitis     SKIN PATCHES     Family History   Problem Relation Name Age of Onset    Heart disease Mother Janet Calle     Kidney disease Mother Janet Luc     COPD Father Kang Calle Jr.       Social History[2]  Patient Care Team:  Nancy Mclaughlin MD as PCP - General (Family Medicine)  Lily Prater MD (Obstetrics and Gynecology)  Samra Hayward MD as Consulting Physician (Dermatology)   Subjective:   ROS  See HPI for details  All Other ROS: Negative except as stated in HPI  Patient Reported Health Risk Assessment     Objective:   /72   Pulse 71   Ht 5' 3" (1.6 m)   Wt 98.2 kg (216 lb 6.4 oz)   SpO2 95%   BMI 38.33 kg/m²   Physical Exam  Vitals reviewed.   Constitutional:       Appearance: Normal appearance. She is obese.   HENT:      Head: Normocephalic and atraumatic.      Right Ear: Tympanic membrane, ear canal and external ear normal.      Left Ear: Tympanic membrane, ear canal and external ear normal.      Nose: Nose normal. No congestion or rhinorrhea.      Comments: Swollen nasal cavity     Mouth/Throat:      Mouth: Mucous membranes are moist.      Pharynx: Oropharynx is clear.   Eyes:      General: No scleral icterus.     Extraocular Movements: Extraocular movements intact.      Conjunctiva/sclera: Conjunctivae normal.   Cardiovascular:      Rate and Rhythm: Normal rate and regular rhythm.      Pulses: Normal pulses.      Heart sounds: Normal heart sounds.   Pulmonary:      Effort: Pulmonary effort is normal.      Breath sounds: Normal breath sounds.      Comments: Coughing throughout exam  Abdominal:      General: Abdomen is flat. Bowel sounds are normal.      Palpations: Abdomen is soft.      Tenderness: There is no abdominal tenderness.   Musculoskeletal:         General: No swelling or deformity. Normal range of " motion.      Cervical back: Normal range of motion and neck supple.   Skin:     General: Skin is warm and dry.   Neurological:      Mental Status: She is alert and oriented to person, place, and time.   Psychiatric:         Mood and Affect: Mood normal.         Behavior: Behavior normal.         Thought Content: Thought content normal.         Judgment: Judgment normal.            No data to display                  6/10/2025     3:00 PM 11/26/2024     2:45 PM 11/4/2024     2:00 PM 6/10/2024     2:15 PM 4/8/2024     2:30 PM 3/6/2024     2:30 PM 2/7/2024     1:30 PM   Fall Risk Assessment - Outpatient   Mobility Status Ambulatory Ambulatory Ambulatory Ambulatory Ambulatory Ambulatory Ambulatory   Number of falls 0 0 0 0 0 0 0   Identified as fall risk False False False False False False False              Assessment:       ICD-10-CM ICD-9-CM   1. Wellness examination  Z00.00 V70.0   2. Morbid (severe) obesity due to excess calories  E66.01 278.01   3. Positive colorectal cancer screening using Cologuard test  R19.5 787.7   4. Hypertension, unspecified type  I10 401.9   5. Primary hypertension  I10 401.9   6. Cough, unspecified type  R05.9 786.2   7. HSV (herpes simplex virus) infection  B00.9 054.9   8. Hypothyroidism, unspecified type  E03.9 244.9      Plan:   1. Wellness examination  Assessment & Plan:  Provided Celena Velasco with a 5-10 year written screening schedule and personal prevention plan. Recommendations were developed using the USPSTF age appropriate recommendations. Education, counseling, and referrals were provided as needed. After Visit Summary printed and given to patient which includes a list of additional screenings\tests needed.         2. Morbid (severe) obesity due to excess calories  Overview:  Ozempic was not covered by insurance  Dieudonne caused pain and aches    Assessment & Plan:  Improving  Continue lifestyle modifications      3. Positive colorectal cancer screening using Cologuard  test  Assessment & Plan:  Urgent referral to GI     Orders:  -     Ambulatory referral/consult to Gastroenterology; Future; Expected date: 06/17/2025    4. Hypertension, unspecified type  Overview:  CCB caused edema    Assessment & Plan:  Continue current medication regimen: Benazepril 40mg qd, HCTZ 50mg qd  Blood pressure at goal <140/90 (<130/80 if otherwise noted)  Recommend DASH diet  Avoid tobacco use  Record BP at home daily and bring log to next office visit, assure that home cuff is calibrated at minimum every 12 months      Orders:  -     benazepriL (LOTENSIN) 40 MG tablet; See Instructions, TAKE 1 TABLET BY MOUTH DAILY, # 90 tab(s), 3 Refill(s), [id number], 162, cm, Height/Length Dosing, 09/14/21 8:45:00 CDT, 101.85, kg, Weight Dosing, 09/14/21 8:45:00 CDT  Dispense: 90 tablet; Refill: 3    5. Primary hypertension  Overview:  CCB caused edema    Assessment & Plan:  Continue current medication regimen: Benazepril 40mg qd, HCTZ 50mg qd  Blood pressure at goal <140/90 (<130/80 if otherwise noted)  Recommend DASH diet  Avoid tobacco use  Record BP at home daily and bring log to next office visit, assure that home cuff is calibrated at minimum every 12 months      Orders:  -     hydroCHLOROthiazide (HYDRODIURIL) 50 MG tablet; Take 1 tablet (50 mg total) by mouth once daily.  Dispense: 90 tablet; Refill: 3    6. Cough, unspecified type  Assessment & Plan:  Continue Tessalon Perles, Singulair   Discussed Flonase, Claritin, saline spray for postnasal drip    Orders:  -     montelukast (SINGULAIR) 10 mg tablet; Take 1 tablet (10 mg total) by mouth once daily.  Dispense: 90 tablet; Refill: 3    7. HSV (herpes simplex virus) infection  Assessment & Plan:  Stable  Continue Valtrex prn    Orders:  -     valACYclovir (VALTREX) 500 MG tablet; Take 1 tablet (500 mg total) by mouth 2 (two) times daily.  Dispense: 60 tablet; Refill: 11    8. Hypothyroidism, unspecified type  Overview:  Synthroid  caused depression  [place] thyroid costly     Assessment & Plan:  Continue pork thyroid 120mg qd. Obtain TFTs   At next refill consider Synthroid since probably less costly (prior to refilling [place] thyroid)           Medicare Annual Wellness and Personalized Prevention Plan:   Fall Risk + Home Safety + Hearing Impairment + Depression Screen + Opioid and Substance Abuse Screening + Cognitive Impairment Screen + Health Risk Assessment all reviewed.     Health Maintenance Topics with due status: Not Due       Topic Last Completion Date    Influenza Vaccine 09/10/2018    Lipid Panel 05/04/2023      The patient's Health Maintenance was reviewed and the following appears to be due at this time:   Health Maintenance Due   Topic Date Due    Hepatitis C Screening  Never done    TETANUS VACCINE  Never done    Shingles Vaccine (1 of 2) Never done    Pneumococcal Vaccines (Age 50+) (2 of 2 - PPSV23) 09/14/2016    DEXA Scan  04/13/2021    RSV Vaccine (Age 60+ and Pregnant patients) (1 - 1-dose 75+ series) Never done    COVID-19 Vaccine (1 - 2024-25 season) Never done    Hemoglobin A1c (Prediabetes)  12/01/2024       Advance Care Planning     Date: 06/05/2025    Power of   I initiated the process of voluntary advance care planning today and explained the importance of this process to the patient.  I introduced the concept of advance directives to the patient, as well. Then the patient received detailed information about the importance of designating a Health Care Power of  (HCPOA). She was also instructed to communicate with this person about their wishes for future healthcare, should she become sick and lose decision-making capacity. Dtr Do is POA     If additional concerns were addressed during the dedicated annual exam, a separate E/M code has been provided to evaluate.    Follow up in about 6 months (around 12/10/2025) for Chronic Conditions; pls refer to Dr. DEMARCO Prater,. In addition to their  scheduled follow up, the patient has also been instructed to follow up on as needed basis.     This note was created using Musikki voice recognition software that occasionally misinterpreted phrases or words. Please contact me if any questions may rise regarding documentation to clarify verbiage.    This note was generated with the assistance of ambient listening technology. Verbal consent was obtained by the patient and accompanying visitor(s) for the recording of patient appointment to facilitate this note. I attest to having reviewed and edited the generated note for accuracy, though some syntax or spelling errors may persist. Please contact the author of this note for any clarification.           [1]   Patient Active Problem List  Diagnosis    Morbid (severe) obesity due to excess calories    Hypothyroidism    Hypertension    IGT (impaired glucose tolerance)    Wellness examination    Insomnia    Exercise-induced asthma    HSV (herpes simplex virus) infection    Cherry angioma    Positive colorectal cancer screening using Cologuard test    Cough   [2]   Social History  Socioeconomic History    Marital status:    Tobacco Use    Smoking status: Never    Smokeless tobacco: Never   Substance and Sexual Activity    Alcohol use: Never    Drug use: Never    Sexual activity: Not Currently     Birth control/protection: Other-see comments     Comment: Menopause     Social Drivers of Health     Financial Resource Strain: Low Risk  (6/3/2025)    Overall Financial Resource Strain (CARDIA)     Difficulty of Paying Living Expenses: Not hard at all   Food Insecurity: No Food Insecurity (6/3/2025)    Hunger Vital Sign     Worried About Running Out of Food in the Last Year: Never true     Ran Out of Food in the Last Year: Never true   Transportation Needs: No Transportation Needs (6/3/2025)    PRAPARE - Transportation     Lack of Transportation (Medical): No     Lack of Transportation (Non-Medical): No   Physical Activity:  Inactive (6/3/2025)    Exercise Vital Sign     Days of Exercise per Week: 0 days     Minutes of Exercise per Session: 0 min   Stress: No Stress Concern Present (6/3/2025)    [place] [employer] - Occupational Stress Questionnaire     Feeling of Stress : Only a little   Housing Stability: High Risk (6/3/2025)    Housing Stability Vital Sign     Unable to Pay for Housing in the Last Year: Yes     Homeless in the Last Year: No

## 2025-06-10 ENCOUNTER — OFFICE VISIT (OUTPATIENT)
Dept: PRIMARY CARE CLINIC | Facility: CLINIC | Age: 75
End: 2025-06-10
Payer: MEDICARE

## 2025-06-10 VITALS
HEIGHT: 63 IN | HEART RATE: 71 BPM | DIASTOLIC BLOOD PRESSURE: 72 MMHG | BODY MASS INDEX: 38.34 KG/M2 | SYSTOLIC BLOOD PRESSURE: 139 MMHG | OXYGEN SATURATION: 95 % | WEIGHT: 216.38 LBS

## 2025-06-10 DIAGNOSIS — R19.5 POSITIVE COLORECTAL CANCER SCREENING USING COLOGUARD TEST: ICD-10-CM

## 2025-06-10 DIAGNOSIS — I10 HYPERTENSION, UNSPECIFIED TYPE: ICD-10-CM

## 2025-06-10 DIAGNOSIS — R05.9 COUGH, UNSPECIFIED TYPE: ICD-10-CM

## 2025-06-10 DIAGNOSIS — J32.9 SINUSITIS, UNSPECIFIED CHRONICITY, UNSPECIFIED LOCATION: ICD-10-CM

## 2025-06-10 DIAGNOSIS — I10 PRIMARY HYPERTENSION: ICD-10-CM

## 2025-06-10 DIAGNOSIS — E03.9 HYPOTHYROIDISM, UNSPECIFIED TYPE: ICD-10-CM

## 2025-06-10 DIAGNOSIS — B00.9 HSV (HERPES SIMPLEX VIRUS) INFECTION: ICD-10-CM

## 2025-06-10 DIAGNOSIS — E66.01 MORBID (SEVERE) OBESITY DUE TO EXCESS CALORIES: ICD-10-CM

## 2025-06-10 DIAGNOSIS — Z00.00 WELLNESS EXAMINATION: Primary | ICD-10-CM

## 2025-06-10 PROBLEM — N39.43 URINARY INCONTINENCE, POST-VOID DRIBBLING: Status: RESOLVED | Noted: 2023-11-21 | Resolved: 2025-06-10

## 2025-06-10 PROBLEM — S39.012A LUMBOSACRAL STRAIN: Status: RESOLVED | Noted: 2024-11-04 | Resolved: 2025-06-10

## 2025-06-10 RX ORDER — MONTELUKAST SODIUM 10 MG/1
10 TABLET ORAL DAILY
Qty: 90 TABLET | Refills: 3 | Status: SHIPPED | OUTPATIENT
Start: 2025-06-10 | End: 2025-06-11 | Stop reason: SDUPTHER

## 2025-06-10 RX ORDER — BENAZEPRIL HYDROCHLORIDE 40 MG/1
TABLET ORAL
Qty: 90 TABLET | Refills: 3 | Status: SHIPPED | OUTPATIENT
Start: 2025-06-10 | End: 2025-06-11 | Stop reason: SDUPTHER

## 2025-06-10 RX ORDER — HYDROCHLOROTHIAZIDE 50 MG/1
50 TABLET ORAL DAILY
Qty: 90 TABLET | Refills: 3 | Status: SHIPPED | OUTPATIENT
Start: 2025-06-10

## 2025-06-10 RX ORDER — VALACYCLOVIR HYDROCHLORIDE 500 MG/1
500 TABLET, FILM COATED ORAL 2 TIMES DAILY
Qty: 60 TABLET | Refills: 11 | Status: SHIPPED | OUTPATIENT
Start: 2025-06-10

## 2025-06-10 RX ORDER — MONTELUKAST SODIUM 10 MG/1
10 TABLET ORAL DAILY
Qty: 90 TABLET | Refills: 3 | Status: SHIPPED | OUTPATIENT
Start: 2025-06-10 | End: 2025-06-10 | Stop reason: SDUPTHER

## 2025-06-10 RX ORDER — BENAZEPRIL HYDROCHLORIDE 40 MG/1
TABLET ORAL
Qty: 90 TABLET | Refills: 3 | Status: SHIPPED | OUTPATIENT
Start: 2025-06-10 | End: 2025-06-10 | Stop reason: SDUPTHER

## 2025-06-10 NOTE — ASSESSMENT & PLAN NOTE
Continue Tessalon Perles, Singulair   Discussed Flonase, Claritin, saline spray for postnasal drip

## 2025-06-10 NOTE — ASSESSMENT & PLAN NOTE
Continue pork thyroid 120mg qd. Obtain TFTs   At next refill consider Synthroid since probably less costly (prior to refilling [place] thyroid)

## 2025-06-11 ENCOUNTER — PATIENT OUTREACH (OUTPATIENT)
Facility: CLINIC | Age: 75
End: 2025-06-11
Payer: MEDICARE

## 2025-06-11 DIAGNOSIS — I10 HYPERTENSION, UNSPECIFIED TYPE: ICD-10-CM

## 2025-06-11 DIAGNOSIS — R05.9 COUGH, UNSPECIFIED TYPE: ICD-10-CM

## 2025-06-11 RX ORDER — MONTELUKAST SODIUM 10 MG/1
10 TABLET ORAL DAILY
Qty: 90 TABLET | Refills: 3 | Status: SHIPPED | OUTPATIENT
Start: 2025-06-11

## 2025-06-11 RX ORDER — BENAZEPRIL HYDROCHLORIDE 40 MG/1
TABLET ORAL
Qty: 90 TABLET | Refills: 3 | Status: SHIPPED | OUTPATIENT
Start: 2025-06-11

## 2025-06-11 NOTE — LETTER
AUTHORIZATION FOR RELEASE OF CONFIDENTIAL INFORMATION      2025      Dear Moi,    We are seeing Celena Velasco, date of birth 1950, in the clinic at [facility].  Nancy Mclaughlin MD is the patient's PCP. Celena Velasco has an outstanding lab/procedure at the time we reviewed his chart.  In order to help keep her health information updated, Celena has authorized us to request the following medical record(s):        Mammogram      Pap Smear      Dexa Scan              Please fax any records to Nancy Mclaughlin MD's at  [phone]            Patient Name: Celena Velasco  :1950  Patient Phone #:[phone]                                    Celena Velasco  MRN: [id number]  : 1950  Age: 74 y.o.  Sex: female         Patient/Legal Guardian Signature  This signature was collected at 2024           _______________________________   Printed Name/Relationship to Patient      Consent for Examination and Treatment: I hereby authorize the providers and employees of [facility] ([facility]) to provide medical treatment/services which includes, but is not limited to, performing and administering tests and diagnostic procedures that are deemed necessary, including, but not limited to, imaging examinations, blood tests and other laboratory procedures as may be required by the hospital, clinic, or may be ordered by my physician(s) or persons working under the general and/or special instructions of my physician(s).      I understand and agree that this consent covers all authorized persons, including but not limited to physicians, residents, nurse practitioners, physicians' assistants, specialists, consultants, student nurses, and independently contracted physicians, who are called upon by the physician in charge, to carry out the diagnostic procedures and medical or surgical treatment.     I hereby authorize [facility] to retain or dispose of any specimens or  tissue, should there be such remaining from any test or procedure.     I hereby authorize and give consent for [facility] providers and employees to take photographs, images or videotapes of such diagnostic, surgical or treatment procedures of Patient as may be required by [facility] or as may be ordered by a physician. I further acknowledge and agree that [facility] may use cameras or other devices for patient monitoring.     I am aware that the practice of medicine is not an exact science, and I acknowledge that no guarantees have been made to me as to the outcome of any tests, procedures or treatment.     Authorization for Release of Information: I understand that my insurance company and/or their agents may need information necessary to make determinations about payment/reimbursement. I hereby provide authorization to release to all insurance companies, their successors, assignees, other parties with whom they may have contracted, or others acting on their behalf, that are involved with payment for any hospital and/or clinic charges incurred by the patient, any information that they request and deem necessary for payment/reimbursement, and/or quality review.  I further authorize the release of my health information to physicians or other health care practitioners on staff who are involved in my health care now and in the future, and to other health care providers, entities, or institutions for the purpose of my continued care and treatment, including referrals.     REGISTRATION AUTHORIZATION  Form No. 86604 (Rev. 3/25/2024)    Page 1 of 3                       Medicare Patient's Certification and Authorization to Release Information and Payment Request:  I certify that the information given by me in applying for payment under Title XVIII of the Social Security Act is correct. I authorize any park of medical or other information about me to release to the Social SecurityAdministration, or its intermediaries or carriers,  any information needed for this or a related Medicare claim. I request that payment of authorized benefits be made on my behalf.     Assignment of Insurance Benefits:   I hereby authorize any and all insurance companies, health plans, defined   benefit plans, health insurers or any entity that is or may be responsible for payment of my medical expenses to pay all hospital and medical benefits now due, and to become due and payable to me under any hospital benefits, sick benefits, injury benefits or any other benefit for services rendered to me, including Major Medical Benefits, direct to [facility] and all independently contracted physicians. I assign any and all rights that I may have against any and all insurance companies, health plans, defined benefit plans, health insurers or any entity that is or may be responsible for payment of my medical expenses, including, but not limited to any right to appeal a denial of a claim, any right to bring any action, lawsuit, administrative proceeding, or other cause of action on my behalf. I specifically assign my right to pursue litigation against any and all insurance companies, health plans, defined benefit plans, health insurers or any entity that is or may be responsible for payment of my medical expenses based upon a refusal to pay charges.            E. Valuables: It is understood and agreed that [facility] is not liable for the damage to or loss of any money, jewelry,   documents, dentures, eye glasses, hearing aids, prosthetics, or other property of value.     F. Computer Equipment: I understand and agree that should I choose to use computer equipment owned by [facility] or if I choose to access the Internet via [facility]s network, I do so at my own risk. [facility] is not responsible for any damage to my computer equipment or to any damages of any type that might arise from my loss of equipment or data.     G. Acceptance of Financial Responsibility:  I agree that in  consideration of the services and   supplies that have been   or will be furnished to the patient, I am hereby obligated to pay all charges made for or on the account of the patient according to the standard rates (in effect at the time the services and supplies are delivered) established by [facility], including its Patient Financial Assistance Policy to the extent it is applicable. I understand that I am responsible for all charges, or portions thereof, not covered by insurance or other sources. Patient refunds will be distributed only after balances at all [facility] facilities are paid.     H. Communication Authorization:  I hereby authorize [facility] and its representatives, along with any billing service   or  who may work on their behalf, to contact me on   my cell phone and/or home phone using pre- recorded messages, artificial voice messages, automatic telephone dialing devices or other computer assisted technology, or by electronic      mail, text messaging, or by any other form of electronic communication. This includes, but is not limited to, appointment reminders, yearly physical exam reminders, preventive care reminders, patient campaigns, welcome calls, and calls about account balances on my account or any account on which I am listed as a guarantor. I understand I have the right to opt out of these communications at any time.      Relationship  Between  Facility and  Provider:      I understand that some, but not all, providers furnishing services to the patient are not employees or agents of [facility]. The patient is under the care and supervision of his/her attending physician, and it is the responsibility of the facility and its nursing staff to carry out the instructions of such physicians. It is the responsibility of the patient's physician/designee to obtain the patient's informed consent, when required, for medical or surgical treatment, special diagnostic or therapeutic procedures, or  hospital services rendered for the patient under the special instructions of the physician/designee.           REGISTRATION AUTHORIZATION  Form No. 74569 (Rev. 3/25/2024)    Page 2 of 3                       Immunizations: [facility] shares immunization information with state sponsored health departments to help you and your doctor keep track of your immunization records. By signing, you consent to have this information shared with the health department in your state:                                [place] - LINKS ([place] Immunization Network for Kids Statewide)                                [place] - MIIX ([place] Immunization Information eXchange)                                [place] - ImmPRINT (Immunization Patient Registry with Integrated Technology)     TERM: This authorization is valid for this and subsequent care/treatment I receive at [facility] and will remain valid unless/until revoked in writing by me.     [facility]: As used in this document, [facility] means all [facility] owned and managed facilities, including, but not limited to, all health centers, surgery centers, clinics, urgent care centers, and hospitals.         [facility] complies with applicable Federal civil rights laws and does not discriminate on the basis of race, color, national origin, age, disability, or sex.  ATENCIÓN: si habla tanvir, tiene a madrigal disposición servicios gratuitos de asistencia lingüística. Genny al [phone].  CHÚ Ý: N?u b?n nói Ti?ng Vi?t, có các d?ch v? h? tr? ngôn ng? mi?n phí dành cho b?n. G?i s? [phone].        REGISTRATION AUTHORIZATION  Form No. 18718 (Rev. 3/25/2024)   Page 3 of 3

## 2025-06-11 NOTE — PROGRESS NOTES
Health Maintenance Topic(s) Outreach Outcomes & Actions Taken:    Breast Cancer Screening - Outreach Outcomes & Actions Taken  : External Records Requested & Care Team Updated if Applicable and CHERYL sent to Dr. Lily Prater    Cervical Cancer Screening - Outreach Outcomes & Actions Taken  : External Records Requested & Care Team Updated if Applicable and CHERYL sent to Dr. Lily Prater    Osteoporosis Screening - Outreach Outcomes & Actions Taken  : External Records Requested, Care Team Updated if Applicable and CHERYL sent to Dr. Lily Prater     Additional Notes:

## 2025-06-26 ENCOUNTER — TELEPHONE (OUTPATIENT)
Dept: PRIMARY CARE CLINIC | Facility: CLINIC | Age: 75
End: 2025-06-26
Payer: MEDICARE

## 2025-06-26 DIAGNOSIS — E03.9 HYPOTHYROIDISM, UNSPECIFIED TYPE: ICD-10-CM

## 2025-06-26 RX ORDER — THYROID 120 MG/1
120 TABLET ORAL DAILY
Qty: 90 TABLET | Refills: 3 | Status: SHIPPED | OUTPATIENT
Start: 2025-06-26

## 2025-06-26 NOTE — TELEPHONE ENCOUNTER
Copied from CRM #[id number]. Topic: Medications - Medication Refill  >> Jun 25, 2025  4:57 PM Nida wrote:  Who Called: Celena Velasco    Refill or New Rx:Refill  RX Name and Strength: thyroid, pork, (ARMOUR THYROID) 120 mg Tab  How is the patient currently taking it? (ex. 1XDay):   Is this a 30 day or 90 day RX:   Local or Mail Order: [id number] [place] [facility] [address] AT [facility] [address]      List of preferred pharmacies on file (remove unneeded): [unfilled]  If different Pharmacy is requested, enter Pharmacy information here including location and phone number:     Ordering Provider:       Preferred Method of Contact: Phone Call  Patient's Preferred Phone Number on File: [phone]   Best Call Back Number, if different:  Additional Information:  refill

## 2025-06-30 ENCOUNTER — TELEPHONE (OUTPATIENT)
Dept: PRIMARY CARE CLINIC | Facility: CLINIC | Age: 75
End: 2025-06-30
Payer: MEDICARE

## 2025-06-30 NOTE — TELEPHONE ENCOUNTER
Copied from CRM #[id number]. Topic: General Inquiry - Patient Advice  >> Jun 27, 2025  9:57 AM Nida wrote:  Who Called: Celena Velasco    Caller is requesting assistance/information from provider's office.    Symptoms (please be specific):  bad cough ( yellow)   How long has patient had these symptoms:     List of preferred pharmacies on file (remove unneeded): [unfilled]  If different, enter pharmacy into here including location and phone number:        Preferred Method of Contact: Phone Call  Patient's Preferred Phone Number on File: [phone]   Best Call Back Number, if different:  Additional Information:  pt would like to speak with nurse about med

## 2025-06-30 NOTE — TELEPHONE ENCOUNTER
Copied from CRM #[id number]. Topic: General Inquiry - Patient Advice  >> Jun 25, 2025 12:03 PM Rhea wrote:  .Who Called: Celena Velasco    Caller is requesting assistance/information from provider's office.    Symptoms (please be specific): na   How long has patient had these symptoms:  na  List of preferred pharmacies on file (remove unneeded): [unfilled]  If different, enter pharmacy into here including location and phone number: na      Preferred Method of Contact: Phone Call  Patient's Preferred Phone Number on File: [phone]   Best Call Back Number, if different:  Additional Information: pt want [facility] to give her a call. [facility] stated they will not fill her medication without a PA

## 2025-06-30 NOTE — TELEPHONE ENCOUNTER
Had covid and flu, still coughing up green mucus and when blowing nose also green mucus please advise

## 2025-06-30 NOTE — TELEPHONE ENCOUNTER
Copied from CRM #[id number]. Topic: Appointments - Amb Referral  >> Jun 25, 2025  1:10 PM Ashly wrote:  Who Called: Celena Velasco    Does the patient already have the specialty appointment scheduled?:no  If yes, what is the date of that appointment?:NA  Referral to What Specialty:Neurologist  Reason for Referral:Bulging disk   Does the patient want the referral with a specific physician?:yes  If yes, which provider?: Dr Cathi Rosales   [facility] the [facility]    Preferred Method of Contact: Phone Call  Patient's Preferred Phone Number on File: [phone]   Best Call Back Number, if different:  Additional Information: referral request, please advise, thanks

## 2025-06-30 NOTE — TELEPHONE ENCOUNTER
Copied from CRM #[id number]. Topic: General Inquiry - Patient Advice  >> Jun 30, 2025 11:49 AM Nasrin wrote:  .Who Called: Celena Velasco    Patient is returning phone call    Who Left Message for Patient:[facility]  Does the patient know what this is regarding?:PT stated she's been coughing up green mucus and need advise from nurse--stated its lingering from flu she had. Pls advise       Preferred Method of Contact: Phone Call  Patient's Preferred Phone Number on File: [phone]   Best Call Back Number, if different:  Additional Information:

## 2025-07-02 ENCOUNTER — DOCUMENTATION ONLY (OUTPATIENT)
Dept: PRIMARY CARE CLINIC | Facility: CLINIC | Age: 75
End: 2025-07-02
Payer: MEDICARE

## 2025-07-02 ENCOUNTER — TELEPHONE (OUTPATIENT)
Dept: PRIMARY CARE CLINIC | Facility: CLINIC | Age: 75
End: 2025-07-02
Payer: MEDICARE

## 2025-07-02 DIAGNOSIS — R20.2 TINGLING OF BOTH FEET: Primary | ICD-10-CM

## 2025-07-02 NOTE — TELEPHONE ENCOUNTER
Copied from CRM #[id number]. Topic: General Inquiry - Patient Advice  >> Jul 2, 2025 11:00 AM Ashly wrote:  Who Called: Celena Velasco    Caller is requesting assistance/information from provider's office.    Symptoms (please be specific): NA   How long has patient had these symptoms:  NA  List of preferred pharmacies on file (remove unneeded): [unfilled]  If different, enter pharmacy into here including location and phone number: NA      Preferred Method of Contact: Phone Call  Patient's Preferred Phone Number on File: [phone]   Best Call Back Number, if different:  Additional Information: medical advice, pt called to discuss with [facility] in regards to MRI results ( film and report) done on 6/6/25 @ [facility] and  requesting to have sent to Dr Joey Rosales, neuro Attn Lyubov fax [phone], please advise, thanks

## 2025-07-02 NOTE — TELEPHONE ENCOUNTER
Copied from CRM #[id number]. Topic: General Inquiry - Patient Advice  >> Jul 2, 2025  8:46 AM Jose Martin wrote:  .Who Called: Celena Velasco    Patient is returning phone call    Who Left Message for Patient: [facility]  Does the patient know what this is regarding?:N/A      Preferred Method of Contact: Phone Call    Patient's Preferred Phone Number on File: [phone]     Best Call Back Number, if different:    Additional Information: Pt states she needs a cb from [facility]. Offered assistance but pt didn't give me any information just for [facility] to cb. Saying she been calling just last week. Please advise, thank you